# Patient Record
Sex: FEMALE | Race: ASIAN | NOT HISPANIC OR LATINO | URBAN - METROPOLITAN AREA
[De-identification: names, ages, dates, MRNs, and addresses within clinical notes are randomized per-mention and may not be internally consistent; named-entity substitution may affect disease eponyms.]

---

## 2024-11-05 ENCOUNTER — EMERGENCY (EMERGENCY)
Facility: HOSPITAL | Age: 69
LOS: 0 days | Discharge: HOME | End: 2024-11-05
Attending: EMERGENCY MEDICINE
Payer: COMMERCIAL

## 2024-11-05 VITALS
OXYGEN SATURATION: 98 % | RESPIRATION RATE: 18 BRPM | HEIGHT: 64 IN | WEIGHT: 108.03 LBS | HEART RATE: 71 BPM | SYSTOLIC BLOOD PRESSURE: 135 MMHG | TEMPERATURE: 99 F | DIASTOLIC BLOOD PRESSURE: 82 MMHG

## 2024-11-05 VITALS
DIASTOLIC BLOOD PRESSURE: 88 MMHG | RESPIRATION RATE: 18 BRPM | OXYGEN SATURATION: 99 % | HEART RATE: 82 BPM | TEMPERATURE: 99 F | SYSTOLIC BLOOD PRESSURE: 137 MMHG

## 2024-11-05 LAB
ALBUMIN SERPL ELPH-MCNC: 4 G/DL — SIGNIFICANT CHANGE UP (ref 3.5–5.2)
ALP SERPL-CCNC: 93 U/L — SIGNIFICANT CHANGE UP (ref 30–115)
ALT FLD-CCNC: 14 U/L — SIGNIFICANT CHANGE UP (ref 0–41)
ANION GAP SERPL CALC-SCNC: 10 MMOL/L — SIGNIFICANT CHANGE UP (ref 7–14)
AST SERPL-CCNC: 20 U/L — SIGNIFICANT CHANGE UP (ref 0–41)
B-OH-BUTYR SERPL-SCNC: <0.2 MMOL/L — SIGNIFICANT CHANGE UP
BASOPHILS # BLD AUTO: 0.03 K/UL — SIGNIFICANT CHANGE UP (ref 0–0.2)
BASOPHILS NFR BLD AUTO: 0.4 % — SIGNIFICANT CHANGE UP (ref 0–1)
BILIRUB SERPL-MCNC: 0.2 MG/DL — SIGNIFICANT CHANGE UP (ref 0.2–1.2)
BUN SERPL-MCNC: 23 MG/DL — HIGH (ref 10–20)
CALCIUM SERPL-MCNC: 9.3 MG/DL — SIGNIFICANT CHANGE UP (ref 8.4–10.4)
CHLORIDE SERPL-SCNC: 100 MMOL/L — SIGNIFICANT CHANGE UP (ref 98–110)
CO2 SERPL-SCNC: 23 MMOL/L — SIGNIFICANT CHANGE UP (ref 17–32)
CREAT SERPL-MCNC: 1 MG/DL — SIGNIFICANT CHANGE UP (ref 0.7–1.5)
EGFR: 61 ML/MIN/1.73M2 — SIGNIFICANT CHANGE UP
EOSINOPHIL # BLD AUTO: 0.01 K/UL — SIGNIFICANT CHANGE UP (ref 0–0.7)
EOSINOPHIL NFR BLD AUTO: 0.1 % — SIGNIFICANT CHANGE UP (ref 0–8)
GLUCOSE SERPL-MCNC: 207 MG/DL — HIGH (ref 70–99)
HCT VFR BLD CALC: 35.8 % — LOW (ref 37–47)
HGB BLD-MCNC: 11.8 G/DL — LOW (ref 12–16)
IMM GRANULOCYTES NFR BLD AUTO: 0.4 % — HIGH (ref 0.1–0.3)
LYMPHOCYTES # BLD AUTO: 2.08 K/UL — SIGNIFICANT CHANGE UP (ref 1.2–3.4)
LYMPHOCYTES # BLD AUTO: 29.7 % — SIGNIFICANT CHANGE UP (ref 20.5–51.1)
MCHC RBC-ENTMCNC: 30.3 PG — SIGNIFICANT CHANGE UP (ref 27–31)
MCHC RBC-ENTMCNC: 33 G/DL — SIGNIFICANT CHANGE UP (ref 32–37)
MCV RBC AUTO: 92 FL — SIGNIFICANT CHANGE UP (ref 81–99)
MONOCYTES # BLD AUTO: 0.85 K/UL — HIGH (ref 0.1–0.6)
MONOCYTES NFR BLD AUTO: 12.1 % — HIGH (ref 1.7–9.3)
NEUTROPHILS # BLD AUTO: 4.01 K/UL — SIGNIFICANT CHANGE UP (ref 1.4–6.5)
NEUTROPHILS NFR BLD AUTO: 57.3 % — SIGNIFICANT CHANGE UP (ref 42.2–75.2)
NRBC # BLD: 0 /100 WBCS — SIGNIFICANT CHANGE UP (ref 0–0)
PLATELET # BLD AUTO: 269 K/UL — SIGNIFICANT CHANGE UP (ref 130–400)
PMV BLD: 11 FL — HIGH (ref 7.4–10.4)
POTASSIUM SERPL-MCNC: 4.9 MMOL/L — SIGNIFICANT CHANGE UP (ref 3.5–5)
POTASSIUM SERPL-SCNC: 4.9 MMOL/L — SIGNIFICANT CHANGE UP (ref 3.5–5)
PROT SERPL-MCNC: 6.9 G/DL — SIGNIFICANT CHANGE UP (ref 6–8)
RBC # BLD: 3.89 M/UL — LOW (ref 4.2–5.4)
RBC # FLD: 13.5 % — SIGNIFICANT CHANGE UP (ref 11.5–14.5)
SODIUM SERPL-SCNC: 133 MMOL/L — LOW (ref 135–146)
WBC # BLD: 7.01 K/UL — SIGNIFICANT CHANGE UP (ref 4.8–10.8)
WBC # FLD AUTO: 7.01 K/UL — SIGNIFICANT CHANGE UP (ref 4.8–10.8)

## 2024-11-05 PROCEDURE — 36000 PLACE NEEDLE IN VEIN: CPT

## 2024-11-05 PROCEDURE — 85025 COMPLETE CBC W/AUTO DIFF WBC: CPT

## 2024-11-05 PROCEDURE — 80053 COMPREHEN METABOLIC PANEL: CPT

## 2024-11-05 PROCEDURE — 99284 EMERGENCY DEPT VISIT MOD MDM: CPT

## 2024-11-05 PROCEDURE — 36415 COLL VENOUS BLD VENIPUNCTURE: CPT

## 2024-11-05 PROCEDURE — 82010 KETONE BODYS QUAN: CPT

## 2024-11-05 PROCEDURE — 82962 GLUCOSE BLOOD TEST: CPT

## 2024-11-05 PROCEDURE — 99283 EMERGENCY DEPT VISIT LOW MDM: CPT | Mod: 25

## 2024-11-05 RX ORDER — DIPHENHYDRAMINE HYDROCHLORIDE AND LIDOCAINE HYDROCHLORIDE AND ALUMINUM HYDROXIDE AND MAGNESIUM HYDRO
30 KIT ONCE
Refills: 0 | Status: COMPLETED | OUTPATIENT
Start: 2024-11-05 | End: 2024-11-05

## 2024-11-05 RX ADMIN — Medication 1000 MILLILITER(S): at 17:58

## 2024-11-05 RX ADMIN — DIPHENHYDRAMINE HYDROCHLORIDE AND LIDOCAINE HYDROCHLORIDE AND ALUMINUM HYDROXIDE AND MAGNESIUM HYDRO 30 MILLILITER(S): KIT at 17:26

## 2024-11-05 NOTE — ED ADULT NURSE NOTE - OBJECTIVE STATEMENT
pt is a 69 y.o. female c/o mouth pain, pt reports a hx of mouth ulcers, and no relief. pt is ax4, on room air, pt rates 9/10 pain, pt appears uncomfortable, pt skin is dry and intact, airway is  clear, no other concerns are noted.

## 2024-11-05 NOTE — ED PROVIDER NOTE - NSFOLLOWUPINSTRUCTIONS_ED_ALL_ED_FT
Please follow up with your primary care physician within 24-72 hours and return immediately if symptoms worsen.    Our Emergency Department Referral Coordinators will be reaching out to you in the next 24-48 hours from 9:00am to 5:00pm with a follow up appointment. Please expect a phone call from the hospital in that time frame. If you do not receive a call or if you have any questions or concerns, you can reach them at   [phone]    Oral Ulcers  Oral ulcers are small sores inside the mouth or near the mouth. They may occur on or inside the lips, inside the cheeks, on the tongue, or anywhere else inside or near the mouth. They may be called canker sores or cold sores, which are two types of oral ulcers. Many oral ulcers are harmless and go away on their own. In some cases, oral ulcers may require medical care to determine the cause and proper treatment.    What are the causes?  Common causes of this condition include:  Infections caused by viruses, bacteria, or fungi.  Emotional stress.  Foods or chemicals that irritate the mouth.  Injury or physical irritation of the mouth.  Medicines.  Allergies.  Tobacco use.  Less common causes include:  Skin disease.  A type of herpes virus infection (herpes simplex or herpes zoster).  Oral cancer.  In some cases, the cause may not be known.    What increases the risk?  You are more likely to develop this condition if:  You wear dental braces, dentures, or retainers.  You do not take good care of your mouth and teeth (poor oral hygiene).  You have sensitive skin.  You have a condition that affects the entire body (systemic condition), such as an immune disorder.  What are the signs or symptoms?  A mouth with oral ulcers on the inside of the lips.  The main symptom of this condition is having one or more oval-shaped or round ulcers that have red borders. Symptoms may vary depending on the cause. This includes:  Location of the ulcers. Ulcers may be found inside the mouth, on the gums, or on the insides of the lips or cheeks. They may also be found on the lips or on skin that is near the mouth, such as the cheeks or chin.  Pain. Ulcers can be painful and uncomfortable, or they can be painless.  Appearance of the ulcers. They may look like red blisters and be filled with fluid, or they may be white or yellow patches.  Frequency of outbreaks. Ulcers may go away permanently after one outbreak, or they may come back (recur) often or rarely.  How is this diagnosed?  This condition is diagnosed with a physical exam. Your health care provider may ask you questions about your lifestyle and your medical history. You may have tests, including:  Blood tests.  Removal of a small number of cells from an ulcer to be examined under a microscope (biopsy).  How is this treated?  Treatment depends on the severity and cause of the condition. Oral ulcers often go away on their own in 1–2 weeks. Treatment may include medicines, such as:  Medicines to treat a viral infection (antivirals), a bacterial infection (antibiotics), or a fungal infection (antifungals).  Medicines to help control pain. This may include:  Over-the-counter pain medicines.  Gel, cream, or spray to numb the area (topical anesthetic) if you have severe pain.  Other medicines to coat or numb your mouth.  Follow these instructions at home:  Medicines    Take or use over-the-counter and prescription medicines only as told by your health care provider.  If you were prescribed an antibiotic medicine, take it as told by your health care provider. Do not stop taking the antibiotic even if you start to feel better.  Do not use products that contain benzocaine (including numbing gels) to treat teething or mouth pain in children who are younger than 2 years. These products may cause a rare but serious blood condition.  Eating and drinking    Eat a balanced diet. Do not eat:  Spicy foods.  Citrus, such as oranges.  Other foods and drinks that you think may cause or irritate your ulcers.  Drink enough fluid to keep your urine pale yellow.  Avoid drinking alcohol.  Lifestyle    How to floss your teeth, with close-up showing that a person should floss the sides of each tooth.  Practice good oral hygiene:  Gently brush your teeth with a soft toothbrush two times a day.  Floss your teeth every day.  Get regular dental cleanings and checkups.  Do not use any products that contain nicotine or tobacco. These products include cigarettes, chewing tobacco, and vaping devices, such as e-cigarettes. If you need help quitting, ask your health care provider.  Managing pain    If directed, put ice on your face in the affected area to help reduce pain. To do this:  Put ice in a plastic bag.  Place a towel between your skin and the bag.  Leave the ice on for 20 minutes, 2–3 times a day.  Remove the ice if your skin turns bright red. This is very important. If you cannot feel pain, heat, or cold, you have a greater risk of damage to the area.  Avoid physical or chemical irritants that may have caused the ulcers or made them worse, such as mouthwashes that contain alcohol (ethanol). If you wear dental braces, dentures, or retainers, work with your health care provider to make sure these devices are fitted correctly.  If you were prescribed a prescription mouthwash to help reduce pain in your mouth, use it as told by your health care provider.  General instructions    Rinse your mouth with a mixture of salt and water 3–4 times a day or as told by your health care provider. To make salt water, completely dissolve ½–1 tsp (3–6 g) of salt in 1 cup (237 mL) of warm water.  Keep all follow-up visits. This is important.  Contact a health care provider if:  You have:  Pain that gets worse or does not get better with medicine.  Four or more ulcers at one time.  A fever.  New ulcers that look or feel different from other ulcers you have.  Inflammation in one eye or both eyes.  Ulcers that do not go away after 10 days.  You develop new symptoms in your mouth, such as:  Bleeding or crusting around your lips or gums.  Tooth pain.  Difficulty swallowing.  You develop symptoms on your skin or genitals, such as:  A rash or blisters.  Burning or itching sensations.  Your ulcers begin or get worse after you start a new medicine.  Get help right away if:  You have difficulty breathing.  You have swelling in your face or neck.  You have excessive bleeding from your mouth.  You have severe pain.  Summary  Oral ulcers may occur anywhere inside or near the mouth.  They can be caused by many things, such as infections, stress, injury or irritation, or tobacco use.  Oral ulcers can be painful or painless.  Treatment may include medicines to relieve pain or to treat an infection (if appropriate).  Most oral ulcers go away in 1–2 weeks.  This information is not intended to replace advice given to you by your health care provider. Make sure you discuss any questions you have with your health care provider.    Document Revised: 09/28/2022 Document Reviewed: 09/28/2022

## 2024-11-05 NOTE — ED ADULT TRIAGE NOTE - CHIEF COMPLAINT QUOTE
pt brought to ED for eval of bleeding mouth ulcers x4 days pt brought to ED for eval of bleeding mouth ulcers and decreased PO intake x4 days

## 2024-11-05 NOTE — ED PROVIDER NOTE - CLINICAL SUMMARY MEDICAL DECISION MAKING FREE TEXT BOX
Patient evaluated for chronic oral ulcers, labs reviewed, advised continue meds that were prescribed and to follow-up closely with dental tomorrow as well as PMD/dermatology this week and patient agreed.  Strict return precautions discussed and patient verbalized understanding.

## 2024-11-05 NOTE — ED PROVIDER NOTE - NS ED ATTENDING STATEMENT MOD
Yes Attending with This was a shared visit with the OJNATHAN. I reviewed and verified the documentation.

## 2024-11-05 NOTE — ED PROVIDER NOTE - PHYSICAL EXAMINATION
Gen: NAD, AOx3  Head: NCAT  HEENT: PERRL, oral mucosa moist, normal conjunctiva, oropharynx clear without exudate or erythema, mild erythematous L oral ulcer   Lung: CTAB, no respiratory distress, no wheezing, rales, rhonchi  CV: normal s1/s2, rrr, Normal perfusion, pulses 2+ throughout  Abd: soft, NTND, no CVA tenderness  Genitourinary: no pelvic tenderness  MSK: No edema, no visible deformities, full range of motion in all 4 extremities  Neuro: CN II-XII grossly intact, No focal neurologic deficits  Skin: No rash   Psych: normal affect

## 2024-11-05 NOTE — ED PROVIDER NOTE - ATTENDING APP SHARED VISIT CONTRIBUTION OF CARE
69-year-old female with PMH HTN, CAD status post CABG, DM, GERD, HLD presents for evaluation of ulcerations inside mouth.  Patient states she has had these for the past 5 years and was treated by dermatologist given an oral and topical medication that she has not been using.  Patient states symptoms have persisted and now recently visiting  [place] for several months so wanted second opinion and came to ED.  Patient reports sx slightly more painful over past couple days. No fevers, chills, headache, dizziness, weight loss.  Patient states that when she eats certain foods it hurts in her gums have been hurting recently.  Denies any recent new medications, URI symptoms, chest pain, shortness of breath, dizziness or weakness.    VITAL SIGNS: noted  CONSTITUTIONAL: Well-developed; well-nourished; in no acute distress  HEAD: Normocephalic; atraumatic  EYES: PERRL, EOM intact; conjunctiva and sclera clear  ENT: No nasal discharge; airway clear. MMM, several apthous type ulcers noted in cheeks bilateral, posterior pharynx wnl, no tonsillar hypertrophy, phonating normally, no caries noted  NECK: Supple; non tender. No anterior cervical lymphadenopathy noted  CARD: S1, S2 normal; no murmurs, gallops, or rubs. Regular rate and rhythm  RESP: CTAB/L, no wheezes, rales or rhonchi  ABD: Normal bowel sounds; soft; non-distended; non-tender; no hepatosplenomegaly. No CVA tenderness  EXT: Normal ROM. No calf tenderness or edema. Distal pulses intact  NEURO: Alert, oriented. Grossly unremarkable. No focal deficits  SKIN: Skin exam is warm and dry, no acute rash

## 2024-11-05 NOTE — ED PROVIDER NOTE - ATTENDING CONTRIBUTION TO CARE
69-year-old female with PMH HTN, CAD status post CABG, DM, GERD, HLD presents for evaluation of ulcerations inside mouth.  Patient states she has had these for the past 5 years and was treated by dermatologist given an oral and topical medication that she has not been using.  Patient [facility] have persisted [facility] opinion and came to ED.  Patient reports sx slightly more painful over past couple days. No fevers, chills, headache, dizziness, weight loss.  Patient states that when she eats certain foods it hurts in her gums have been hurting recently.  Denies any recent new medications, URI symptoms, chest pain, shortness of breath, dizziness or weakness.    VITAL SIGNS: noted  CONSTITUTIONAL: Well-developed; well-nourished; in no acute distress  HEAD: Normocephalic; atraumatic  EYES: PERRL, EOM intact; conjunctiva and sclera clear  ENT: No nasal discharge; airway clear. MMM, several apthous type ulcers noted in cheeks bilateral, posterior pharynx wnl, no tonsillar hypertrophy, phonating normally, no caries noted  NECK: Supple; non tender. No anterior cervical lymphadenopathy noted  CARD: S1, S2 normal; no murmurs, gallops, or rubs. Regular rate and rhythm  RESP: CTAB/L, no wheezes, rales or rhonchi  ABD: Normal bowel sounds; soft; non-distended; non-tender; no hepatosplenomegaly. No CVA tenderness  EXT: Normal ROM. No calf tenderness or edema. Distal pulses intact  NEURO: Alert, oriented. Grossly unremarkable. No focal deficits  SKIN: Skin exam is warm and dry, no acute rash

## 2024-11-05 NOTE — ED PROVIDER NOTE - PATIENT PORTAL LINK FT
You can access the FollowMyHealth Patient Portal offered by [facility] by registering at the following website: [email or web address]. By joining [employer]’s FollowMyHealth portal, you will also be able to view your health information using other applications (apps) compatible with our system.

## 2024-11-05 NOTE — ED PROVIDER NOTE - OBJECTIVE STATEMENT
Patient/Caregiver provided printed discharge information.
69-year-old female with a past medical history of hypertension, hyperlipidemia, diabetes, GERD presents complaining of mouth ulcers over the past 5 years.  Patient states to be visiting from [place] and has followed with her dermatologist back home for the mouth ulcers several years prior. Patient states to have experienced increased pain over the past 4 days with worsening when eating/drinking. pt denies any other symptoms including fevers, chill, headache, recent illness/travel, cough, abdominal pain, chest pain, or SOB.

## 2024-11-06 NOTE — CHART NOTE - NSCHARTNOTEFT_GEN_A_CORE
appt requested in [facility] Chat, appt scheduled on 12/19 @ 2:45pm @ [address] - [facility] 11/6 (derm referral)

## 2024-12-19 ENCOUNTER — APPOINTMENT (OUTPATIENT)
Dept: DERMATOLOGY | Facility: CLINIC | Age: 69
End: 2024-12-19

## 2025-02-27 NOTE — ED ADULT NURSE NOTE - PAIN: BODY LOCATION
[FreeTextEntry3] : Bilateral breast ultrasound  Four-quadrant survey the left breast demonstrates no suspicious findings  Four-quadrant survey the right breast demonstrates no suspicious findings  BI-RADS 2 mouth

## 2025-06-26 ENCOUNTER — INPATIENT (INPATIENT)
Facility: HOSPITAL | Age: 70
LOS: 3 days | Discharge: HOME | DRG: 93 | End: 2025-06-30
Attending: PSYCHIATRY & NEUROLOGY | Admitting: PSYCHIATRY & NEUROLOGY
Payer: MEDICARE

## 2025-06-26 VITALS
TEMPERATURE: 98 F | HEART RATE: 83 BPM | SYSTOLIC BLOOD PRESSURE: 180 MMHG | OXYGEN SATURATION: 97 % | WEIGHT: 112.88 LBS | DIASTOLIC BLOOD PRESSURE: 90 MMHG | RESPIRATION RATE: 18 BRPM

## 2025-06-26 DIAGNOSIS — R20.0 ANESTHESIA OF SKIN: ICD-10-CM

## 2025-06-26 DIAGNOSIS — Z98.890 OTHER SPECIFIED POSTPROCEDURAL STATES: Chronic | ICD-10-CM

## 2025-06-26 LAB
ALBUMIN SERPL ELPH-MCNC: 4.4 G/DL — SIGNIFICANT CHANGE UP (ref 3.5–5.2)
ALP SERPL-CCNC: 106 U/L — SIGNIFICANT CHANGE UP (ref 30–115)
ALT FLD-CCNC: 12 U/L — SIGNIFICANT CHANGE UP (ref 0–41)
ANION GAP SERPL CALC-SCNC: 13 MMOL/L — SIGNIFICANT CHANGE UP (ref 7–14)
APPEARANCE UR: CLEAR — SIGNIFICANT CHANGE UP
APTT BLD: 30.7 SEC — SIGNIFICANT CHANGE UP (ref 27–39.2)
AST SERPL-CCNC: 12 U/L — SIGNIFICANT CHANGE UP (ref 0–41)
BASOPHILS # BLD AUTO: 0.04 K/UL — SIGNIFICANT CHANGE UP (ref 0–0.2)
BASOPHILS NFR BLD AUTO: 0.4 % — SIGNIFICANT CHANGE UP (ref 0–1)
BILIRUB SERPL-MCNC: 0.2 MG/DL — SIGNIFICANT CHANGE UP (ref 0.2–1.2)
BILIRUB UR-MCNC: NEGATIVE — SIGNIFICANT CHANGE UP
BUN SERPL-MCNC: 26 MG/DL — HIGH (ref 10–20)
CALCIUM SERPL-MCNC: 9.8 MG/DL — SIGNIFICANT CHANGE UP (ref 8.4–10.5)
CHLORIDE SERPL-SCNC: 97 MMOL/L — LOW (ref 98–110)
CO2 SERPL-SCNC: 22 MMOL/L — SIGNIFICANT CHANGE UP (ref 17–32)
COLOR SPEC: YELLOW — SIGNIFICANT CHANGE UP
CREAT SERPL-MCNC: 1 MG/DL — SIGNIFICANT CHANGE UP (ref 0.7–1.5)
DIFF PNL FLD: NEGATIVE — SIGNIFICANT CHANGE UP
EGFR: 60 ML/MIN/1.73M2 — SIGNIFICANT CHANGE UP
EGFR: 60 ML/MIN/1.73M2 — SIGNIFICANT CHANGE UP
EOSINOPHIL # BLD AUTO: 0 K/UL — SIGNIFICANT CHANGE UP (ref 0–0.7)
EOSINOPHIL NFR BLD AUTO: 0 % — SIGNIFICANT CHANGE UP (ref 0–8)
GLUCOSE BLDC GLUCOMTR-MCNC: 312 MG/DL — HIGH (ref 70–99)
GLUCOSE SERPL-MCNC: 285 MG/DL — HIGH (ref 70–99)
GLUCOSE UR QL: 500 MG/DL
HCT VFR BLD CALC: 36.8 % — LOW (ref 37–47)
HGB BLD-MCNC: 12.5 G/DL — SIGNIFICANT CHANGE UP (ref 12–16)
IMM GRANULOCYTES NFR BLD AUTO: 0.6 % — HIGH (ref 0.1–0.3)
INR BLD: 0.78 RATIO — SIGNIFICANT CHANGE UP (ref 0.65–1.3)
KETONES UR QL: NEGATIVE MG/DL — SIGNIFICANT CHANGE UP
LEUKOCYTE ESTERASE UR-ACNC: NEGATIVE — SIGNIFICANT CHANGE UP
LYMPHOCYTES # BLD AUTO: 1.86 K/UL — SIGNIFICANT CHANGE UP (ref 1.2–3.4)
LYMPHOCYTES # BLD AUTO: 20 % — LOW (ref 20.5–51.1)
MCHC RBC-ENTMCNC: 30.6 PG — SIGNIFICANT CHANGE UP (ref 27–31)
MCHC RBC-ENTMCNC: 34 G/DL — SIGNIFICANT CHANGE UP (ref 32–37)
MCV RBC AUTO: 90 FL — SIGNIFICANT CHANGE UP (ref 81–99)
MONOCYTES # BLD AUTO: 0.74 K/UL — HIGH (ref 0.1–0.6)
MONOCYTES NFR BLD AUTO: 8 % — SIGNIFICANT CHANGE UP (ref 1.7–9.3)
NEUTROPHILS # BLD AUTO: 6.59 K/UL — HIGH (ref 1.4–6.5)
NEUTROPHILS NFR BLD AUTO: 71 % — SIGNIFICANT CHANGE UP (ref 42.2–75.2)
NITRITE UR-MCNC: NEGATIVE — SIGNIFICANT CHANGE UP
NRBC BLD AUTO-RTO: 0 /100 WBCS — SIGNIFICANT CHANGE UP (ref 0–0)
PH UR: 7 — SIGNIFICANT CHANGE UP (ref 5–8)
PLATELET # BLD AUTO: 278 K/UL — SIGNIFICANT CHANGE UP (ref 130–400)
PMV BLD: 11.2 FL — HIGH (ref 7.4–10.4)
POTASSIUM SERPL-MCNC: 4.8 MMOL/L — SIGNIFICANT CHANGE UP (ref 3.5–5)
POTASSIUM SERPL-SCNC: 4.8 MMOL/L — SIGNIFICANT CHANGE UP (ref 3.5–5)
PROT SERPL-MCNC: 7.3 G/DL — SIGNIFICANT CHANGE UP (ref 6–8)
PROT UR-MCNC: NEGATIVE MG/DL — SIGNIFICANT CHANGE UP
PROTHROM AB SERPL-ACNC: 9.2 SEC — LOW (ref 9.95–12.87)
RBC # BLD: 4.09 M/UL — LOW (ref 4.2–5.4)
RBC # FLD: 13.3 % — SIGNIFICANT CHANGE UP (ref 11.5–14.5)
SODIUM SERPL-SCNC: 132 MMOL/L — LOW (ref 135–146)
SP GR SPEC: 1.01 — SIGNIFICANT CHANGE UP (ref 1–1.03)
TROPONIN T, HIGH SENSITIVITY RESULT: 20 NG/L — HIGH (ref 6–13)
TROPONIN T, HIGH SENSITIVITY RESULT: 21 NG/L — HIGH (ref 6–13)
UROBILINOGEN FLD QL: 0.2 MG/DL — SIGNIFICANT CHANGE UP (ref 0.2–1)
WBC # BLD: 9.29 K/UL — SIGNIFICANT CHANGE UP (ref 4.8–10.8)
WBC # FLD AUTO: 9.29 K/UL — SIGNIFICANT CHANGE UP (ref 4.8–10.8)

## 2025-06-26 PROCEDURE — 80061 LIPID PANEL: CPT

## 2025-06-26 PROCEDURE — C1769: CPT

## 2025-06-26 PROCEDURE — 72131 CT LUMBAR SPINE W/O DYE: CPT

## 2025-06-26 PROCEDURE — 97167 OT EVAL HIGH COMPLEX 60 MIN: CPT | Mod: GO

## 2025-06-26 PROCEDURE — 80053 COMPREHEN METABOLIC PANEL: CPT

## 2025-06-26 PROCEDURE — 36224 PLACE CATH CAROTD ART: CPT | Mod: 50

## 2025-06-26 PROCEDURE — 84100 ASSAY OF PHOSPHORUS: CPT

## 2025-06-26 PROCEDURE — 36227 PLACE CATH XTRNL CAROTID: CPT | Mod: RT

## 2025-06-26 PROCEDURE — 84443 ASSAY THYROID STIM HORMONE: CPT

## 2025-06-26 PROCEDURE — 83735 ASSAY OF MAGNESIUM: CPT

## 2025-06-26 PROCEDURE — 36415 COLL VENOUS BLD VENIPUNCTURE: CPT

## 2025-06-26 PROCEDURE — 70450 CT HEAD/BRAIN W/O DYE: CPT | Mod: 26,XU

## 2025-06-26 PROCEDURE — A9579: CPT

## 2025-06-26 PROCEDURE — 86900 BLOOD TYPING SEROLOGIC ABO: CPT

## 2025-06-26 PROCEDURE — C1894: CPT

## 2025-06-26 PROCEDURE — 99285 EMERGENCY DEPT VISIT HI MDM: CPT

## 2025-06-26 PROCEDURE — 72131 CT LUMBAR SPINE W/O DYE: CPT | Mod: 26

## 2025-06-26 PROCEDURE — 80048 BASIC METABOLIC PNL TOTAL CA: CPT

## 2025-06-26 PROCEDURE — 93306 TTE W/DOPPLER COMPLETE: CPT

## 2025-06-26 PROCEDURE — 85730 THROMBOPLASTIN TIME PARTIAL: CPT

## 2025-06-26 PROCEDURE — 70498 CT ANGIOGRAPHY NECK: CPT | Mod: 26

## 2025-06-26 PROCEDURE — 85025 COMPLETE CBC W/AUTO DIFF WBC: CPT

## 2025-06-26 PROCEDURE — 70551 MRI BRAIN STEM W/O DYE: CPT

## 2025-06-26 PROCEDURE — 86850 RBC ANTIBODY SCREEN: CPT

## 2025-06-26 PROCEDURE — 72141 MRI NECK SPINE W/O DYE: CPT

## 2025-06-26 PROCEDURE — 36226 PLACE CATH VERTEBRAL ART: CPT | Mod: 50

## 2025-06-26 PROCEDURE — 82962 GLUCOSE BLOOD TEST: CPT

## 2025-06-26 PROCEDURE — C1887: CPT

## 2025-06-26 PROCEDURE — 70496 CT ANGIOGRAPHY HEAD: CPT | Mod: 26

## 2025-06-26 PROCEDURE — 85610 PROTHROMBIN TIME: CPT

## 2025-06-26 PROCEDURE — 83036 HEMOGLOBIN GLYCOSYLATED A1C: CPT

## 2025-06-26 PROCEDURE — 86901 BLOOD TYPING SEROLOGIC RH(D): CPT

## 2025-06-26 PROCEDURE — 71045 X-RAY EXAM CHEST 1 VIEW: CPT | Mod: 26

## 2025-06-26 PROCEDURE — 97163 PT EVAL HIGH COMPLEX 45 MIN: CPT | Mod: GP

## 2025-06-26 PROCEDURE — 76937 US GUIDE VASCULAR ACCESS: CPT

## 2025-06-26 RX ORDER — DEXTROSE 50 % IN WATER 50 %
15 SYRINGE (ML) INTRAVENOUS ONCE
Refills: 0 | Status: DISCONTINUED | OUTPATIENT
Start: 2025-06-26 | End: 2025-06-30

## 2025-06-26 RX ORDER — METOPROLOL SUCCINATE 50 MG/1
1 TABLET, EXTENDED RELEASE ORAL
Refills: 0 | DISCHARGE

## 2025-06-26 RX ORDER — ASPIRIN 325 MG
81 TABLET ORAL DAILY
Refills: 0 | Status: DISCONTINUED | OUTPATIENT
Start: 2025-06-27 | End: 2025-06-30

## 2025-06-26 RX ORDER — GLUCAGON 3 MG/1
1 POWDER NASAL ONCE
Refills: 0 | Status: DISCONTINUED | OUTPATIENT
Start: 2025-06-26 | End: 2025-06-30

## 2025-06-26 RX ORDER — DEXTROSE 50 % IN WATER 50 %
25 SYRINGE (ML) INTRAVENOUS ONCE
Refills: 0 | Status: DISCONTINUED | OUTPATIENT
Start: 2025-06-26 | End: 2025-06-30

## 2025-06-26 RX ORDER — CLOPIDOGREL BISULFATE 75 MG/1
75 TABLET, FILM COATED ORAL ONCE
Refills: 0 | Status: COMPLETED | OUTPATIENT
Start: 2025-06-26 | End: 2025-06-26

## 2025-06-26 RX ORDER — DEXTROSE 50 % IN WATER 50 %
12.5 SYRINGE (ML) INTRAVENOUS ONCE
Refills: 0 | Status: DISCONTINUED | OUTPATIENT
Start: 2025-06-26 | End: 2025-06-30

## 2025-06-26 RX ORDER — SODIUM CHLORIDE 9 G/1000ML
1000 INJECTION, SOLUTION INTRAVENOUS
Refills: 0 | Status: DISCONTINUED | OUTPATIENT
Start: 2025-06-26 | End: 2025-06-30

## 2025-06-26 RX ORDER — INSULIN LISPRO 100 U/ML
INJECTION, SOLUTION INTRAVENOUS; SUBCUTANEOUS
Refills: 0 | Status: DISCONTINUED | OUTPATIENT
Start: 2025-06-26 | End: 2025-06-30

## 2025-06-26 RX ORDER — ATORVASTATIN CALCIUM 80 MG/1
80 TABLET, FILM COATED ORAL AT BEDTIME
Refills: 0 | Status: DISCONTINUED | OUTPATIENT
Start: 2025-06-26 | End: 2025-06-30

## 2025-06-26 RX ORDER — LOSARTAN POTASSIUM 100 MG/1
1 TABLET, FILM COATED ORAL
Refills: 0 | DISCHARGE

## 2025-06-26 RX ORDER — CLOPIDOGREL BISULFATE 75 MG/1
75 TABLET, FILM COATED ORAL DAILY
Refills: 0 | Status: DISCONTINUED | OUTPATIENT
Start: 2025-06-27 | End: 2025-06-29

## 2025-06-26 RX ORDER — ENOXAPARIN SODIUM 100 MG/ML
40 INJECTION SUBCUTANEOUS EVERY 24 HOURS
Refills: 0 | Status: DISCONTINUED | OUTPATIENT
Start: 2025-06-26 | End: 2025-06-30

## 2025-06-26 RX ORDER — INSULIN GLARGINE-YFGN 100 [IU]/ML
0 INJECTION, SOLUTION SUBCUTANEOUS
Refills: 0 | DISCHARGE

## 2025-06-26 RX ORDER — LOSARTAN POTASSIUM 100 MG/1
50 TABLET, FILM COATED ORAL DAILY
Refills: 0 | Status: DISCONTINUED | OUTPATIENT
Start: 2025-06-26 | End: 2025-06-30

## 2025-06-26 RX ORDER — METOPROLOL SUCCINATE 50 MG/1
12.5 TABLET, EXTENDED RELEASE ORAL
Refills: 0 | Status: DISCONTINUED | OUTPATIENT
Start: 2025-06-26 | End: 2025-06-30

## 2025-06-26 RX ORDER — MELATONIN 5 MG
1 TABLET ORAL
Refills: 0 | DISCHARGE

## 2025-06-26 RX ORDER — OMEPRAZOLE 20 MG/1
1 CAPSULE, DELAYED RELEASE ORAL
Refills: 0 | DISCHARGE

## 2025-06-26 RX ORDER — ASPIRIN 325 MG
81 TABLET ORAL ONCE
Refills: 0 | Status: COMPLETED | OUTPATIENT
Start: 2025-06-26 | End: 2025-06-26

## 2025-06-26 RX ORDER — FERROUS SULFATE 137(45) MG
325 TABLET, EXTENDED RELEASE ORAL
Refills: 0 | DISCHARGE

## 2025-06-26 RX ORDER — METFORMIN HYDROCHLORIDE 500 MG/1
1 TABLET ORAL
Refills: 0 | DISCHARGE

## 2025-06-26 RX ORDER — MELATONIN 5 MG
5 TABLET ORAL AT BEDTIME
Refills: 0 | Status: DISCONTINUED | OUTPATIENT
Start: 2025-06-26 | End: 2025-06-30

## 2025-06-26 RX ADMIN — CLOPIDOGREL BISULFATE 75 MILLIGRAM(S): 75 TABLET, FILM COATED ORAL at 20:14

## 2025-06-26 RX ADMIN — Medication 81 MILLIGRAM(S): at 20:14

## 2025-06-26 RX ADMIN — ATORVASTATIN CALCIUM 80 MILLIGRAM(S): 80 TABLET, FILM COATED ORAL at 22:31

## 2025-06-26 RX ADMIN — INSULIN LISPRO 8: 100 INJECTION, SOLUTION INTRAVENOUS; SUBCUTANEOUS at 22:30

## 2025-06-26 RX ADMIN — Medication 5 MILLIGRAM(S): at 22:31

## 2025-06-26 NOTE — ED PROVIDER NOTE - EKG/XRAY ADDITIONAL INFORMATION
Independent interpretation of the chest  film performed by: Dr. Emiliano Alexander: [facility]   ekg - Normal sinus rhythm nonspecific ST-T wave lateral change

## 2025-06-26 NOTE — ED PROVIDER NOTE - CLINICAL SUMMARY MEDICAL DECISION MAKING FREE TEXT BOX
Pt signed out to me f/u neuro consult - 69 y/o F with a PMHx of HTN, HLD, CAD (supposed to be on ASA 81mg once daily), DM, GERD, chronic back issues with back surgery in [place],STEMI in [place] 5 years ago, presenting to the ED with subjective decreased sensation starting in the LLE x 2 days ago and in the LUE yesterday. 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 Admitted to stroke unit for suspected stroke.

## 2025-06-26 NOTE — ED PROVIDER NOTE - WR ORDER STATUS 1
full  Mouth opening: > = 3 FB   Dental:    (+) edentulous      Pulmonary:normal exam  breath sounds clear to auscultation  (+)  COPD (not clinically symptomatic):          current smoker                          ROS comment: 1/2 pack to 1 pack/day smoking tobacco    EXAM: CT CHEST WO CONTRAST     DATE: 11/24/2023 9:19 AM     INDICATION: Rule out chest injury     COMPARISON: None     IV CONTRAST: None     TECHNIQUE:   Noncontrast thin axial images were obtained through the chest. Coronal and  sagittal reformats were generated. CT dose reduction was achieved through use of  a standardized protocol tailored for this examination and automatic exposure  control for dose modulation.     FINDINGS:      CHEST WALL: Partially imaged displaced, comminuted right clavicular fracture.  Subcutaneous stranding in the right upper anterior chest wall. No collections.  THYROID: No nodule.  MEDIASTINUM: No mass or lymphadenopathy.  BELTRAN: No mass or lymphadenopathy.  THORACIC AORTA: No aneurysm.  MAIN PULMONARY ARTERY: Normal in caliber.  TRACHEA/BRONCHI: Patent.  ESOPHAGUS: No wall thickening or dilatation.  HEART: Normal size. Moderate coronary calcium.  PLEURA: No effusion or pneumothorax.  LUNGS: Severe centrilobular emphysema without without masses, or suspicious  pulmonary nodules. Mild consolidation/groundglass in the dependent right lower  lobe. Associated mild bronchiectasis.  VISUALIZED ABDOMEN: Scattered low-density hepatic lesions most compatible with  cysts.     ADDITIONAL COMMENTS: N/A     IMPRESSION:  1.  Mild consolidation/groundglass posterior dependent right lower lobe,  possibly from atelectasis versus contusion. Component of mild chronic fibrosis  is likely.  2.  Displaced, mildly comminuted right midclavicular fracture.  3.  Healing posterior right eighth rib fracture.  4.  Severe emphysema.   5.  Moderate coronary calcium.   Cardiovascular:    (+) hypertension:, past MI: > 6 months and no interval change,  Performed Resulted

## 2025-06-26 NOTE — H&P ADULT - NSICDXPASTSURGICALHX_GEN_ALL_CORE_FT
PDMP reviewed; no aberrant behavior identified, prescription authorized.    Medication: amphetamine-dextroamphetamine (Adderall) 30 MG tablet     Last refill: 02/27/2024  Next appt: 08/07/2024     PAST SURGICAL HISTORY:  Status post lumbar surgery

## 2025-06-26 NOTE — H&P ADULT - ASSESSMENT
69 y/o F with a PMHx of HTN, HLD, CAD (supposed to be on ASA 81mg once daily), DM, GERD, chronic back issues with back surgery in [place],STEMI in [place] 5 years ago, presenting to the ED with subjective decreased sensation starting in the LLE x 2 days ago and in the LUE yesterday. NIHSS 2 for decreased sensation in LUE/LLE (face sparing), and LOC month. 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 Admitted to stroke unit for suspected stroke.     #Possible R sided lacunar infarct   - aspirin 81mg and plavix 75mg daily  - atorvastatin 80mg daily  - q8hr stroke neuro checks and vitals  -MRI brain non con   -TTE   -PT/OT/SLP     Cards  #HTN  - Goal -160   - hold home blood pressure medication for now  - obtain TTE with bubble    #7mm aneurysm   -LISA consult     #HLD  - high dose statin     DVT Prophylaxis  - lovenox sq for DVT prophylaxis   - SCDs for DVT prophylaxis      69 y/o F with a PMHx of HTN, HLD, CAD (supposed to be on ASA 81mg once daily), DM, GERD, chronic back issues with back surgery in [place],STEMI in [place] 5 years ago, presenting to the ED with subjective decreased sensation starting in the LLE x 2 days ago and in the LUE yesterday. NIHSS 2 for decreased sensation in LUE/LLE (face sparing), and LOC month. 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 Admitted to stroke unit for suspected stroke.     #Possible R sided lacunar infarct   - aspirin 81mg and plavix 75mg daily  - atorvastatin 80mg daily  - q8hr stroke neuro checks and vitals  -MRI brain non con   -TTE   -PT/OT/SLP     Cards  #HTN  #CAD  - Goal -160   - obtain TTE with bubble  - c/w home Metoprolol and Losartan    #7mm aneurysm   -LISA consult     #DM  - f/u A1c  - Started sliding scale. Monitor FS and adjust insulin as needed.     #HLD  - high dose statin       #MISC  DVT Prophylaxis  - lovenox sq for DVT prophylaxis   - SCDs for DVT prophylaxis   - Melatonin 5mg at night

## 2025-06-26 NOTE — ED PROVIDER NOTE - NS_EDPROVIDERDISPOUSERTYPE_ED_A_ED
Telephone Encounter by Polina Lugo at 04/27/17 08:51 AM     Author:  Polina Lugo Service:  (none) Author Type:  Patient      Filed:  04/27/17 08:51 AM Encounter Date:  4/27/2017 Status:  Signed     :  Polina Lugo (Patient )       From: Eder Michel  To: Jama Dozier MD  Sent: 4/27/2017  7:28 AM CDT  Subject: Medication Renewal Request    Original authorizing provider: MD Eder Rodriguez would like a refill of the following medications:  escitalopram (LEXAPRO) 10 MG tablet [Jama Dozier MD]    Preferred pharmacy: [address]    Comment:  Good morning. I sent a request in Tuesday morning to refill my   Escitalopram prescription. Since I have not received any correspondence, I   wanted to follow up with my request. If there is an issue or concern with   my request, please let me know. Thank you for your assistance.       Revision History        Date/Time User Provider Type Action    > 04/27/17 08:51 AM Polina Lugo Patient  Sign    Attribution information within the note text is not available.             Attending Attestation (For Attendings USE Only)...

## 2025-06-26 NOTE — ED PROVIDER NOTE - ATTENDING APP SHARED VISIT CONTRIBUTION OF CARE
70 hx htn, dm, gerd, hld, CAD s/p CABG  pt here for eval of L arm pain and L leg numbness. sx started yesterday. no chest pain however, pt states she felt like this before her last MI. no incontinence/retention. no LOZANO.  no palpitations/syncope/trauma/fevers.    vss  gen- NAD, aaox3  card-rrr  lungs-ctab, no wheezing or rhonchi  abd-sntnd, no guarding or rebound  neuro- full str, LLE w/ subj decrease in sensation, otherwise nml, cn ii-xii grossly intact, normal coordination and gait, speech clear

## 2025-06-26 NOTE — H&P ADULT - HISTORY OF PRESENT ILLNESS
69 y/o F with a PMHx of HTN, HLD, CAD (supposed ot be on ASA 81mg once daily), DM, GERD, chronic back issues with back surgery in [place], MI in [place] 5 years ago, presenting to the ED with subjective decreased sensation in the LUE/LLE. Pt states when she woke up 2 days ago, her LLE felt different to her and numb, and when she woke up yesterday, her LUE felt numb in addition to her LLE. She came to the ED with concerns for another MI as she states she felt L sided numbness in her arm and leg at that time too. Pt has not taken any of her medication in the past month because she is visiting [place] and cannot fill her prescription. /90. Denies accompanying speech issues, vision changes, facial involvement, extremity weakness, dizziness. Endorses that her gait has been abnormal for the past couple of years due to her back surgery, however does not feel her gait worsened. Denies known hx of stroke, TIA, seizure, neck pain.

## 2025-06-26 NOTE — ED PROVIDER NOTE - PRINCIPAL DIAGNOSIS
[facility]    Medicine Progress Note - Hospitalist Service    Date of Admission:  8/5/2024    Assessment & Plan   85M with history of HFrEF, hyperlipidemia, hypertension, aortic stenosis status post TAVR, thrombophilia, type 2 diabetes, stage III sacral ulcer, CAD, prior CVA admitted on 8/5/2024 with altered mental status & hypotension secondary to sepsis possibly from urinary tract infection, aspiration pneumonia and suspected infected, unstageable sacral ulcer.     He was managed briefly at the ICU by instensivist on admission but did not require vasopressor. Hypotension and ARIADNE improved with IV hydration and albumin infusion.  Patient transferred to medical floor on 8/6/2024.  However, patient transferred back to ICU for possible septic shock and management with pressors.  On 8/8/2024 patient transferred back to floor.  UC grew E. coli.  BC grew ESBL E Coli & Streptococcus salivarius. Polymicrobial bacteremia thought to be secondary to infected sacral decubitus ulcer.  Patient underwent multiple bedside debridement and eventually OR debridement with diverting colostomy on 8/13/2024.       Assessment/Plan  # Septic shock, resolved  #Infected sacral decubitus ulcer;  #Polymicrobial bacteremia with ESBL and Strep salivarius due to infected sacral ulcer;  Patient underwent multiple bedside debridement and eventually OR debridement with diverting colostomy on 8/13/2024.  -S/p debridement 8/6/2024 with Cx polymicrobial (E Coli, B Fragilis, Staph Aureus, E faecium)  -Ucx ESBL E Coli & Strep salivarius  -Unable to obtain MRI secondary to intracardiac lead. CT Pelvis without any signs of osteo or abscess  -Abx by ID, currently on meropenem (8/7- )  added daptomycin for VRE (8/10- ), vancomycin (8/5-8/8)  -Stoma care, wound vac per WOC and surgery team  -Midodrine 5mg TID was started during this admission for hypotension, (5mg BID 8/10 and 5mg daily 8/11 and then off)-now 8/14 having hypotension  again, added midodrine as needed    #Anasarca - improving.  --Diuretics changed to daily.  Monitor labs closely.    #Electrolyte imbalance-replace per protocol     #Acute on chronic pain from above  -Scheduled Tylenol, try to wean off.  -Oxycodone 2.5-5mg q4h PRN, dilaudid for breakthrough     #History of aspiration and dysphagia due to stroke;  #Aspiration PNA vs CAP LLL  -SLP following, easy to chew level 7 and mildly thick level 2 diet with water protocol  -Video swallow study revealed silent aspiration with thin liquid, including with chin tuck  -Aspiration precaution     #Recent acute ischemic stroke (5/25/2024)  #Residual left Hemiparesis   -Recent admission 5/25-6/7/2024 at [facility] for ischemic stroke; discharged to TCU   -PT/OT      #History of the left upper extremity DVT with thrombophilia (positive antiphospholipid antibodies, heterozygous factor V Leiden on coumadin)  -On Coumadin.  INR subtherapeutic.  Adjusted Coumadin dose, monitor INR closely.  Pharmacy following     #Diabetes mellitus type 2, noninsulin dependent  -home regimen: jardiance    -inpatient: Lantus 5u, insulin sliding scale and hypoglycemic protocol     #Moderate Malnutrition  #S/p PEG   -Nutrition is following. Calorie count ongoing.   -On 9/21, personally discussed with registered dietitian, reported patient not meeting his nutritional needs, will consider nocturnal tube feeding if patient agrees     #S/P TAVR (transcatheter aortic valve replacement) and dual chamber PM 11/2014   #H/o HFrEF with EF recovery  #CAD   -History of distal RCA stent, last echo 8/2019 with EF 50-55%  -had hypotension with resumption of low dose coreg.  Start low dose toprol-xl instead.     -PTA crestor on hold due to patient on daptomycin    #GERD/Hx PUD   -PPI PO qAM      Dispo:  -Ordered DME (Kevin lift, hospital bed, wheelchair)  -Outpatient palliative care consult placed     Need for the hospital bed;  -A condition requiring positioning of body to elevate  pain, that cannot be accommodated in an ordinary bed  -Protection from serious injury that cannot be accommodated in an ordinary bed  -A condition that requires the head of the bed to be elevated more than 30 degrees most of the time, however below her voice is did not meet the patient's needs    Additional semi-electric requirements;  -Patient has history of CVA with residual left-sided weakness.  Patient requires frequent change in body position and/or immediate changes in body positions required to elevate pain or address her medical condition and the patient is able to use the electronic controls.    Wheelchair need;  Need  -All mobility limitations that impair the ability to accomplish mobility-related activities of daily living (MRADLS) entirely, safely are within a reasonable timeframe  Alternative  -A mobility limitation that cannot be resolved by the use of cane or a walker  Use  -The patient's home provides adequate access, maneuvering space, services and patient can safely self propel the manual wheelchair or there is a caregiver who can assist  Benefit  -The benefit of manual wheelchair will significantly improve the ability to participate in MRA LDS and the patient will use it on a regular basis in the home (has not expressed and unwillingness to use)  David-Height  -The patient requires a lower seat height less than 19 inch because of shorter stature or to enable the beneficiary to place his feet on the ground for propulsion.  Accessories;  -Justification for all additional accessories; including anti-tippers (for maneuvering surfaces and ramps), seatbelts (for tone, spasticity or positioning), height adjustable arms (uses wheelchair over 2 hours/day) elevating leg rest, sit with and or depth more than 20 inch          Diet: Snacks/Supplements Adult: Magic Cup; With Meals  Snacks/Supplements Adult: Expedite Cup; With Meals  Room Service  Easy to Chew Diet (level 7) Mildly Thick (level 2) (Water  "protocol)  Snacks/Supplements Adult: Ensure Enlive; With Meals    DVT Prophylaxis: Warfarin  Javier Catheter: PRESENT, indication: Acute retention or obstruction, Wound deterioration and failed external collection device  Lines: PRESENT      PICC 08/15/24 Single Lumen Right Brachial vein medial IV Antibiotics-Site Assessment: WDL      Cardiac Monitoring: None  Code Status: No CPR- Do NOT Intubate      Clinically Significant Risk Factors        # Hypokalemia: Lowest K = 3.2 mmol/L in last 2 days, will replace as needed     # Hypomagnesemia: Lowest Mg = 1.6 mg/dL in last 2 days, will replace as needed   # Hypoalbuminemia: Lowest albumin = 2.2 g/dL at 8/5/2024 11:55 AM, will monitor as appropriate     # Hypertension: Noted on problem list          # DMII: A1C = N/A within past 6 months   # Overweight: Estimated body mass index is 25.26 kg/m  as calculated from the following:    Height as of this encounter: 1.753 m (5' 9\").    Weight as of this encounter: 77.6 kg (171 lb 1.2 oz).   # Moderate Malnutrition: based on nutrition assessment    # Financial/Environmental Concerns:                 Disposition Plan     Medically Ready for Discharge: Anticipated in 2-4 Days             Jamshid Gardner MD  Hospitalist Service  [facility]  Securely message with [employer] (more info)  Text page via [employer] Paging/Directory   ______________________________________________________________________    Interval History   Patient seen and examined.  Notes, labs, imaging report personally reviewed.  Patient denied new concern or complaints.  Discussed with nursing staff.  Discussed with care manager and requesting to document will still need semi-electric hospital bed need for the patient.  Discussed with dietitian.  I called patient's son yesterday who did not  the phone, I will try to reach him again today    Physical Exam   Vital Signs: Temp: 97.6  F (36.4  C) Temp src: Oral BP: 134/58 Pulse: 73   Resp: 20 SpO2: 98 % " O2 Device: None (Room air)    Weight: 171 lbs 1.23 oz      General: Not in obvious distress.  HEENT: Normocephalic, supple neck  Chest: Clear to auscultation bilateral anteriorly, no wheezing  Heart: S1S2 normal, regular  Abdomen: Soft.  No significant tenderness, colostomy bag in place  Extremities: Lower extremity swelling improving  Neuro: alert and awake, residual left-sided weakness from previous stroke        Medical Decision Making             Data     I have personally reviewed the following data over the past 24 hrs:    N/A  \   N/A   / N/A     139 97 (L) 14.4 /  75   3.6 36 (H) 0.53 (L) \     INR:  1.80 (H) PTT:  N/A   D-dimer:  N/A Fibrinogen:  N/A       Imaging results reviewed over the past 24 hrs:   No results found for this or any previous visit (from the past 24 hour(s)).   Left leg numbness

## 2025-06-26 NOTE — ED PROVIDER NOTE - PHYSICAL EXAMINATION
Gen: NAD, AOx3  Head: NCAT  HEENT: PERRL, oral mucosa moist, normal conjunctiva, oropharynx clear without exudate or erythema  Lung: CTAB, no respiratory distress, no wheezing, rales, rhonchi  CV: normal s1/s2, rrr, Normal perfusion, pulses 2+ throughout  Abd: soft, NTND, no CVA tenderness  Genitourinary: no pelvic tenderness  MSK: No edema, no visible deformities, full range of motion in all 4 extremities  Neuro: LLE decreased sensation, CN II-XII grossly intact, No focal neurologic deficits, no meningeal signs, no nystagmus/pronator drift, coordination intact, strength 5/5 BUE/BLE  Skin: No rash   Psych: normal affect

## 2025-06-26 NOTE — H&P ADULT - NSICDXPASTMEDICALHX_GEN_ALL_CORE_FT
PAST MEDICAL HISTORY:  Chronic GERD     HLD (hyperlipidemia)     HTN (hypertension)     Myocardial infarct

## 2025-06-26 NOTE — ED PROVIDER NOTE - OBJECTIVE STATEMENT
70-year-old female with a past medical history of hypertension, hyperlipidemia, CAD, diabetes, GERD presents complaining of intermittent left arm pain and left leg numbness for 2 days.  Patient denies any recent fall/injury/trauma. patient mentions to have prior back surgeries.  Patient currently visiting from [place] and has no medical care here. pt denies any other symptoms including fevers, chill, headache, recent illness/travel, cough, abdominal pain, chest pain, or SOB.

## 2025-06-26 NOTE — ED ADULT TRIAGE NOTE - GLASGOW COMA SCALE: BEST VERBAL RESPONSE, MLM
PRE-SEDATION ASSESSMENT    CONSENT  Consent for procedure and sedation obtained: Yes    MEDICAL HISTORY       PHYSICAL EXAM  History and Physical Reviewed: Patient has valid H&P within 30 days. I have reviewed and there are no changes.  Airway Risk History: No previous complications  Airway Anatomy : Class II  Heart : Normal  Lungs : Normal  LOC/Mental Status : Normal    OTHER FINDINGS  Reviewed current medications and allergies: Yes  Pertinent lab/diagnostic test reviewed: Yes    SEDATION RISK ASSESSMENT  Risk Status ASA: Class II - Normal patient with mild systemic disease  Plan for Sedation: Moderate Sedation  Indications for Procedure/Pre-Procedure Diagnosis and Planned Procedure: Renal failure requiring HD presenting for permcath placement  EKG Monitoring: Yes    NARRATIVE FINDINGS      (V5) oriented

## 2025-06-26 NOTE — ED ADULT NURSE NOTE - OBJECTIVE STATEMENT
left arm pain and intermittent left leg numbness since last night. As per patient, it felt like her previous heart attack but denies chest pain this time.

## 2025-06-26 NOTE — ED ADULT TRIAGE NOTE - CHIEF COMPLAINT QUOTE
c/o left arm pain and intermittent left leg numbness since last night. As per patient, it felt like her previous heart attack but denies chest pain this time.

## 2025-06-26 NOTE — H&P ADULT - NSHPPHYSICALEXAM_GEN_ALL_CORE
Used  #[id number]    Physical exam:  General: No acute distress, awake and alert    Neurologic:  -Mental status: Awake, alert, oriented to person, place, year but not month. Speech is fluent with intact naming, repetition, and comprehension, no dysarthria. Recent and remote memory intact. Follows commands. Attention/concentration intact. Fund of knowledge appropriate.  -Cranial nerves:   II: Visual fields are full to confrontation. BTT intact.   III, IV, VI: Extraocular movements are intact without nystagmus. Pupils equally round and reactive to light  V:  Facial sensation V1-V3 equal and intact   VII: Face is symmetric with normal eye closure and smile  XII: Tongue protrudes midline  Motor: Normal bulk and tone. No pronator drift. Strength bilateral upper extremity 5/5, bilateral lower extremities 5/5.  Sensation: decreased LUE/LLE sensation to pin prick and light touch, temperature intact.   Coordination: No dysmetria on finger-to-nose and heel-to-shin bilaterally  Reflexes: hyporeflexic in b/l LEs   Gait: limping, not ataxic, pt states this is baseline     NIHSS 2 for sensation and LOC questions (month)

## 2025-06-26 NOTE — H&P ADULT - NSHPLABSRESULTS_GEN_ALL_CORE
< from: CT Head No Cont (06.26.25 @ 14:33) >    IMPRESSION:    1.  No evidence of acute intracranial pathology.    2.  Moderate/severe chronic microvascular-type changes.    3.  Apparent dilatation of the right vertebral artery, recommend   attention on CTA (scheduled).    < end of copied text >    < from: CT Angio Neck w/ IV Cont (06.26.25 @ 15:52) >      IMPRESSION:    1.  No evidence of major vascular stenosis or occlusion.    2.  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    3.Right thyroid nodule measuring 1.5 cm. Further evaluation with   thyroid sonogram may be obtained on an outpatient/elective basis.    < end of copied text >

## 2025-06-27 ENCOUNTER — APPOINTMENT (OUTPATIENT)
Dept: NEUROLOGY | Facility: HOSPITAL | Age: 70
End: 2025-06-27

## 2025-06-27 ENCOUNTER — RESULT REVIEW (OUTPATIENT)
Age: 70
End: 2025-06-27

## 2025-06-27 LAB
A1C WITH ESTIMATED AVERAGE GLUCOSE RESULT: 10 % — HIGH (ref 4–5.6)
A1C WITH ESTIMATED AVERAGE GLUCOSE RESULT: 9.6 % — HIGH (ref 4–5.6)
ALBUMIN SERPL ELPH-MCNC: 4.3 G/DL — SIGNIFICANT CHANGE UP (ref 3.5–5.2)
ALP SERPL-CCNC: 102 U/L — SIGNIFICANT CHANGE UP (ref 30–115)
ALT FLD-CCNC: 10 U/L — SIGNIFICANT CHANGE UP (ref 0–41)
ANION GAP SERPL CALC-SCNC: 14 MMOL/L — SIGNIFICANT CHANGE UP (ref 7–14)
ANION GAP SERPL CALC-SCNC: 15 MMOL/L — HIGH (ref 7–14)
APTT BLD: 33.2 SEC — SIGNIFICANT CHANGE UP (ref 27–39.2)
AST SERPL-CCNC: 12 U/L — SIGNIFICANT CHANGE UP (ref 0–41)
BILIRUB SERPL-MCNC: 0.3 MG/DL — SIGNIFICANT CHANGE UP (ref 0.2–1.2)
BUN SERPL-MCNC: 28 MG/DL — HIGH (ref 10–20)
BUN SERPL-MCNC: 28 MG/DL — HIGH (ref 10–20)
CALCIUM SERPL-MCNC: 9.4 MG/DL — SIGNIFICANT CHANGE UP (ref 8.4–10.5)
CALCIUM SERPL-MCNC: 9.7 MG/DL — SIGNIFICANT CHANGE UP (ref 8.4–10.5)
CHLORIDE SERPL-SCNC: 100 MMOL/L — SIGNIFICANT CHANGE UP (ref 98–110)
CHLORIDE SERPL-SCNC: 96 MMOL/L — LOW (ref 98–110)
CHOLEST SERPL-MCNC: 239 MG/DL — HIGH
CO2 SERPL-SCNC: 17 MMOL/L — SIGNIFICANT CHANGE UP (ref 17–32)
CO2 SERPL-SCNC: 23 MMOL/L — SIGNIFICANT CHANGE UP (ref 17–32)
CREAT SERPL-MCNC: 0.9 MG/DL — SIGNIFICANT CHANGE UP (ref 0.7–1.5)
CREAT SERPL-MCNC: 1.1 MG/DL — SIGNIFICANT CHANGE UP (ref 0.7–1.5)
EGFR: 54 ML/MIN/1.73M2 — LOW
EGFR: 54 ML/MIN/1.73M2 — LOW
EGFR: 68 ML/MIN/1.73M2 — SIGNIFICANT CHANGE UP
EGFR: 68 ML/MIN/1.73M2 — SIGNIFICANT CHANGE UP
ESTIMATED AVERAGE GLUCOSE: 229 MG/DL — HIGH (ref 68–114)
ESTIMATED AVERAGE GLUCOSE: 240 MG/DL — HIGH (ref 68–114)
GLUCOSE BLDC GLUCOMTR-MCNC: 103 MG/DL — HIGH (ref 70–99)
GLUCOSE BLDC GLUCOMTR-MCNC: 178 MG/DL — HIGH (ref 70–99)
GLUCOSE BLDC GLUCOMTR-MCNC: 253 MG/DL — HIGH (ref 70–99)
GLUCOSE BLDC GLUCOMTR-MCNC: 408 MG/DL — HIGH (ref 70–99)
GLUCOSE BLDC GLUCOMTR-MCNC: 82 MG/DL — SIGNIFICANT CHANGE UP (ref 70–99)
GLUCOSE SERPL-MCNC: 402 MG/DL — HIGH (ref 70–99)
GLUCOSE SERPL-MCNC: 420 MG/DL — HIGH (ref 70–99)
HDLC SERPL-MCNC: 52 MG/DL — SIGNIFICANT CHANGE UP
INR BLD: 0.81 RATIO — SIGNIFICANT CHANGE UP (ref 0.65–1.3)
LDLC SERPL-MCNC: 148 MG/DL — HIGH
LIPID PNL WITH DIRECT LDL SERPL: 148 MG/DL — HIGH
MAGNESIUM SERPL-MCNC: 2 MG/DL — SIGNIFICANT CHANGE UP (ref 1.8–2.4)
NONHDLC SERPL-MCNC: 187 MG/DL — HIGH
POTASSIUM SERPL-MCNC: 5 MMOL/L — SIGNIFICANT CHANGE UP (ref 3.5–5)
POTASSIUM SERPL-MCNC: 5.1 MMOL/L — HIGH (ref 3.5–5)
POTASSIUM SERPL-SCNC: 5 MMOL/L — SIGNIFICANT CHANGE UP (ref 3.5–5)
POTASSIUM SERPL-SCNC: 5.1 MMOL/L — HIGH (ref 3.5–5)
PROT SERPL-MCNC: 6.9 G/DL — SIGNIFICANT CHANGE UP (ref 6–8)
PROTHROM AB SERPL-ACNC: 9.5 SEC — LOW (ref 9.95–12.87)
SODIUM SERPL-SCNC: 132 MMOL/L — LOW (ref 135–146)
SODIUM SERPL-SCNC: 133 MMOL/L — LOW (ref 135–146)
TRIGL SERPL-MCNC: 217 MG/DL — HIGH
TSH SERPL-MCNC: 3.19 UIU/ML — SIGNIFICANT CHANGE UP (ref 0.27–4.2)

## 2025-06-27 PROCEDURE — 36227 PLACE CATH XTRNL CAROTID: CPT | Mod: RT

## 2025-06-27 PROCEDURE — 76377 3D RENDER W/INTRP POSTPROCES: CPT | Mod: 26

## 2025-06-27 PROCEDURE — 36226 PLACE CATH VERTEBRAL ART: CPT | Mod: 50

## 2025-06-27 PROCEDURE — 70551 MRI BRAIN STEM W/O DYE: CPT | Mod: 26

## 2025-06-27 PROCEDURE — 93306 TTE W/DOPPLER COMPLETE: CPT | Mod: 26

## 2025-06-27 PROCEDURE — 36224 PLACE CATH CAROTD ART: CPT | Mod: 50

## 2025-06-27 PROCEDURE — 72141 MRI NECK SPINE W/O DYE: CPT | Mod: 26

## 2025-06-27 RX ORDER — ACETAMINOPHEN 500 MG/5ML
650 LIQUID (ML) ORAL EVERY 6 HOURS
Refills: 0 | Status: DISCONTINUED | OUTPATIENT
Start: 2025-06-27 | End: 2025-06-30

## 2025-06-27 RX ORDER — LABETALOL HYDROCHLORIDE 200 MG/1
5 TABLET, FILM COATED ORAL ONCE
Refills: 0 | Status: COMPLETED | OUTPATIENT
Start: 2025-06-27 | End: 2025-06-27

## 2025-06-27 RX ORDER — ONDANSETRON HCL/PF 4 MG/2 ML
4 VIAL (ML) INJECTION ONCE
Refills: 0 | Status: COMPLETED | OUTPATIENT
Start: 2025-06-27 | End: 2025-06-27

## 2025-06-27 RX ADMIN — Medication 650 MILLIGRAM(S): at 23:16

## 2025-06-27 RX ADMIN — Medication 81 MILLIGRAM(S): at 12:25

## 2025-06-27 RX ADMIN — LOSARTAN POTASSIUM 50 MILLIGRAM(S): 100 TABLET, FILM COATED ORAL at 07:01

## 2025-06-27 RX ADMIN — LABETALOL HYDROCHLORIDE 5 MILLIGRAM(S): 200 TABLET, FILM COATED ORAL at 00:54

## 2025-06-27 RX ADMIN — Medication 4 MILLIGRAM(S): at 21:55

## 2025-06-27 RX ADMIN — Medication 650 MILLIGRAM(S): at 22:34

## 2025-06-27 RX ADMIN — INSULIN LISPRO 12: 100 INJECTION, SOLUTION INTRAVENOUS; SUBCUTANEOUS at 12:23

## 2025-06-27 RX ADMIN — CLOPIDOGREL BISULFATE 75 MILLIGRAM(S): 75 TABLET, FILM COATED ORAL at 12:25

## 2025-06-27 RX ADMIN — ATORVASTATIN CALCIUM 80 MILLIGRAM(S): 80 TABLET, FILM COATED ORAL at 21:54

## 2025-06-27 RX ADMIN — METOPROLOL SUCCINATE 12.5 MILLIGRAM(S): 50 TABLET, EXTENDED RELEASE ORAL at 07:01

## 2025-06-27 RX ADMIN — Medication 20 MILLIGRAM(S): at 07:01

## 2025-06-27 NOTE — OCCUPATIONAL THERAPY INITIAL EVALUATION ADULT - GENERAL OBSERVATIONS, REHAB EVAL
Pt received semi gann in ED3 in bed, + tele, +IV lock, agreeable to OT evaluation, Turkmen  utilized througohut # [id number], left seated at edge of bed with neuroendovascular Dr Hoang present, RN Pedro aware of all, BP within parameters throughout

## 2025-06-27 NOTE — BRIEF OPERATIVE NOTE - OPERATION/FINDINGS
Procedure:  Diagnostic Cerebral Angiogram   Contrast: Visipaque 320   Medication:  Verapamil 2.5mg, Nitroglycerin 200 mcg IA; Heparin 50 IU/kg body weight IV; see also anesthesiology note  Implants placed: None  Complications: None    Preliminary Report:  A selective diagnostic cerebral angiogram was performed. On preliminary review, a right VA fusiform aneurysm was identified. A right radial arteriotomy site was used and a TR band was applied at closure.    Please continue to monitor the arteriotomy site for signs of hematoma/ bleeding/ infection and for diminished or absent distal pulses or temperature/ color changes.   NIHSS pre procedure: 0  NIHSS post procedure: 0  Extremities: RUE remains c/d/i, nontender, no hematoma, no continued bleeding, no color or temperature changes, +radial pulse    Official IR neuro procedure note is to follow.

## 2025-06-27 NOTE — OCCUPATIONAL THERAPY INITIAL EVALUATION ADULT - GROSSLY INTACT, SENSORY
pt reports that LUE numbness that was present on admission is not currently present but does intermittently return/Left UE/Right UE/Grossly Intact

## 2025-06-27 NOTE — PATIENT PROFILE ADULT - FALL HARM RISK - HARM RISK INTERVENTIONS
Assistance with ambulation/Assistance OOB with selected safe patient handling equipment/Communicate Risk of Fall with Harm to all staff/Discuss with provider need for PT consult/Monitor for mental status changes/Monitor gait and stability/Provide patient with walking aids - walker, cane, crutches/Reinforce activity limits and safety measures with patient and family/Tailored Fall Risk Interventions/Use of alarms - bed, chair and/or voice tab/Visual Cue: Yellow wristband and red socks/Bed in lowest position, wheels locked, appropriate side rails in place/Call bell, personal items and telephone in reach/Instruct patient to call for assistance before getting out of bed or chair/Non-slip footwear when patient is out of bed/[place] to call system/Physically safe environment - no spills, clutter or unnecessary equipment/Purposeful Proactive Rounding/Room/bathroom lighting operational, light cord in reach

## 2025-06-27 NOTE — PATIENT PROFILE ADULT - TOBACCO USE
M follow up call made:No answer. Left message with return call information.  If no return call, will follow up at a later date.     Received a return call from the patient who just received a treatment today. She will have a PET scan on Monday to assess if the treatments are working. She reported doing ok, just tired at times.  ACM instructed to call into the office for any immediate questions or concerns.  RN will follow up at a later time.    Maggie Escobedo RN, BSN  Ambulatory Care Manager  [phone]       Never smoker

## 2025-06-27 NOTE — OCCUPATIONAL THERAPY INITIAL EVALUATION ADULT - LIVES WITH, PROFILE
pt lives in [place] with spouse, however frequently visits dtr here, has been staying with daughter and will stay with her on d/c, apartment, ground level, no stairs in or out

## 2025-06-27 NOTE — ASU PATIENT PROFILE, ADULT - FALL HARM RISK - UNIVERSAL INTERVENTIONS
Bed in lowest position, wheels locked, appropriate side rails in place/Call bell, personal items and telephone in reach/Instruct patient to call for assistance before getting out of bed or chair/Non-slip footwear when patient is out of bed/[place] to call system/Physically safe environment - no spills, clutter or unnecessary equipment/Purposeful Proactive Rounding/Room/bathroom lighting operational, light cord in reach

## 2025-06-27 NOTE — CONSULT NOTE ADULT - SUBJECTIVE AND OBJECTIVE BOX
Neuroendovascular Consult:   Consulted for:  Aneurysm     HPI:  71 y/o F with a PMHx of HTN, HLD, CAD (supposed ot be on ASA 81mg once daily), DM, GERD, chronic back issues with back surgery in [place], [place] 5 years ago, presenting to the ED with subjective decreased sensation in the LUE/LLE. Pt states when she woke up 2 days ago, her LLE felt different to her and numb, and when she woke up yesterday, her LUE felt numb in addition to her LLE. She came to the ED with concerns for another MI as she states she felt L sided numbness in her arm and leg at that time too. Pt has not taken any of her medication in the past month because she is visiting [place] and cannot fill her prescription. /90. Denies accompanying speech issues, vision changes, facial involvement, extremity weakness, dizziness. Endorses that her gait has been abnormal for the past couple of years due to her back surgery, however does not feel her gait worsened. Denies known hx of stroke, TIA, seizure, neck pain.  (26 Jun 2025 19:36)    Interval HPI: A neuroendovascular consult was placed for evaluation of a right VA aneurysm on CTA head/ neck. The patient is Bulgarian speaking and an  was used via [employer] for this encounter (#documented on the procedural consent form). The patient has no acute complaints.    PAST MEDICAL & SURGICAL HISTORY:  HTN (hypertension)  HLD (hyperlipidemia)  Myocardial infarct  Chronic GERD  Status post lumbar surgery    Pertinent PMHx    MEDICATIONS  (STANDING):  aspirin enteric coated 81 milliGRAM(s) Oral daily  atorvastatin 80 milliGRAM(s) Oral at bedtime  clopidogrel Tablet 75 milliGRAM(s) Oral daily  dextrose 5%. 1000 milliLiter(s) (50 mL/Hr) IV Continuous <Continuous>  dextrose 5%. 1000 milliLiter(s) (100 mL/Hr) IV Continuous <Continuous>  dextrose 50% Injectable 25 Gram(s) IV Push once  dextrose 50% Injectable 12.5 Gram(s) IV Push once  dextrose 50% Injectable 25 Gram(s) IV Push once  enoxaparin Injectable 40 milliGRAM(s) SubCutaneous every 24 hours  famotidine    Tablet 20 milliGRAM(s) Oral two times a day  glucagon  Injectable 1 milliGRAM(s) IntraMuscular once  insulin lispro (ADMELOG) corrective regimen sliding scale   SubCutaneous three times a day before meals  losartan 50 milliGRAM(s) Oral daily  melatonin 5 milliGRAM(s) Oral at bedtime  metoprolol tartrate 12.5 milliGRAM(s) Oral two times a day    MEDICATIONS  (PRN):  dextrose Oral Gel 15 Gram(s) Oral once PRN Blood Glucose LESS THAN 70 milliGRAM(s)/deciliter    Allergies  No Known Allergies  Intolerances    Physical Exam:   Vital Signs Last 24 Hrs  T(C): 36.2 (27 Jun 2025 19:50), Max: 36.8 (26 Jun 2025 20:41)  T(F): 97.1 (27 Jun 2025 19:50), Max: 98.2 (26 Jun 2025 20:41)  HR: 64 (27 Jun 2025 19:50) (54 - 77)  BP: 140/63 (27 Jun 2025 19:50) (90/52 - 171/84)  BP(mean): --  RR: 16 (27 Jun 2025 19:50) (15 - 20)  SpO2: 98% (27 Jun 2025 19:50) (96% - 99%)    Parameters below as of 27 Jun 2025 19:50  Patient On (Oxygen Delivery Method): room air    General: NAD  Neuro: AAOx3, following commands, PERRLA, EOMI, visual fields full, SAUNDERS AG without drift, sensation intact to light touch (had presented w/ LUE/ LLE numbness, which she now denies having), no dysarthria, no aphasia, no dysmetria on FTN or HTS testing, V1-2 intact and symmetrical.  NIHSS 0    Labs:                         12.5   9.29  )-----------( 278      ( 26 Jun 2025 14:04 )             36.8     06-27    132[L]  |  100  |  28[H]  ----------------------------<  402[H]  5.1[H]   |  17  |  0.9    Ca    9.4      27 Jun 2025 11:56  Mg     2.0     06-27    TPro  6.9  /  Alb  4.3  /  TBili  0.3  /  DBili  x   /  AST  12  /  ALT  10  /  AlkPhos  102  06-27    PT/INR - ( 27 Jun 2025 11:56 )   PT: 9.50 sec;   INR: 0.81 ratio         PTT - ( 27 Jun 2025 11:56 )  PTT:33.2 sec    Pertinent labs:                      12.5   9.29  )-----------( 278      ( 26 Jun 2025 14:04 )             36.8       06-27    132[L]  |  100  |  28[H]  ----------------------------<  402[H]  5.1[H]   |  17  |  0.9    Ca    9.4      27 Jun 2025 11:56  Mg     2.0     06-27    TPro  6.9  /  Alb  4.3  /  TBili  0.3  /  DBili  x   /  AST  12  /  ALT  10  /  AlkPhos  102  06-27      PT/INR - ( 27 Jun 2025 11:56 )   PT: 9.50 sec;   INR: 0.81 ratio         PTT - ( 27 Jun 2025 11:56 )  PTT:33.2 sec    Radiology & Additional Studies:   Radiology imaging reviewed.     Assessment:   70-year-old female with a PMHx of HTN, HLD, CAD, DM, GERD, chronic back pain, MI, who presented with decreased LUE/ LLE sensation. A neuroendovascular consult was placed for evaluation of a right VA aneurysm on CTA head/ neck. The patient is Bulgarian speaking and an  was used via [employer] for this encounter (#documented on the procedural consent form). The patient has no acute complaints.    Suggestions:   - Diagnostic cerebral angiogram to evaluate the aneurysm (consent obtained from the patient in Bulgarian and the patient's daughter and  were notified at the patient's request)  - MRI brain prior to the above procedure   - Continued management by the neurovascular team   - Plan to follow up outpatient with Dr Hoang after the diagnostic cerebral angiogram. No acute intervention is planned for the VA aneurysm on this admission.  [id number] Neuroendovascular with additional questions on this admission

## 2025-06-27 NOTE — PRE PROCEDURE NOTE - PRE PROCEDURE EVALUATION
Interventional Neuro Radiology  Pre-Procedure Note ANNETTE    HPI:  69 y/o F with a PMHx of HTN, HLD, CAD (supposed ot be on ASA 81mg once daily), DM, GERD, chronic back issues with back surgery in [place], MI in [place] 5 years ago, presenting to the ED with subjective decreased sensation in the LUE/LLE. Pt states when she woke up 2 days ago, her LLE felt different to her and numb, and when she woke up yesterday, her LUE felt numb in addition to her LLE. She came to the ED with concerns for another MI as she states she felt L sided numbness in her arm and leg at that time too. Pt has not taken any of her medication in the past month because she is visiting [place] and cannot fill her prescription. /90. Denies accompanying speech issues, vision changes, facial involvement, extremity weakness, dizziness. Endorses that her gait has been abnormal for the past couple of years due to her back surgery, however does not feel her gait worsened. Denies known hx of stroke, TIA, seizure, neck pain.  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 The patient was NPO except medication after midnight last night. NIHSS 2 for LUE/ LLE sensory deficit. The patient's daughter was contacted regarding the procedure and the patient spoke with her  by phone.     Allergies: No Known Allergies    PAST MEDICAL & SURGICAL HISTORY:  HTN (hypertension)  HLD (hyperlipidemia)  Myocardial infarct  Chronic GERD  Status post lumbar surgery    Current Medications: aspirin enteric coated 81 milliGRAM(s) Oral daily  atorvastatin 80 milliGRAM(s) Oral at bedtime  clopidogrel Tablet 75 milliGRAM(s) Oral daily  dextrose 5%. 1000 milliLiter(s) IV Continuous <Continuous>  dextrose 5%. 1000 milliLiter(s) IV Continuous <Continuous>  dextrose 50% Injectable 25 Gram(s) IV Push once  dextrose 50% Injectable 12.5 Gram(s) IV Push once  dextrose 50% Injectable 25 Gram(s) IV Push once  dextrose Oral Gel 15 Gram(s) Oral once PRN  enoxaparin Injectable 40 milliGRAM(s) SubCutaneous every 24 hours  famotidine    Tablet 20 milliGRAM(s) Oral two times a day  glucagon  Injectable 1 milliGRAM(s) IntraMuscular once  insulin lispro (ADMELOG) corrective regimen sliding scale   SubCutaneous three times a day before meals  losartan 50 milliGRAM(s) Oral daily  melatonin 5 milliGRAM(s) Oral at bedtime  metoprolol tartrate 12.5 milliGRAM(s) Oral two times a day    Labs:                         12.5   9.29  )-----------( 278      ( 26 Jun 2025 14:04 )             36.8       06-27    132[L]  |  100  |  28[H]  ----------------------------<  402[H]  5.1[H]   |  17  |  0.9    Ca    9.4      27 Jun 2025 11:56  Mg     2.0     06-27    TPro  6.9  /  Alb  4.3  /  TBili  0.3  /  DBili  x   /  AST  12  /  ALT  10  /  AlkPhos  102  06-27          Blood Bank: 06-27-25  --  A POS  --    Assessment/Plan:   This is a 70-year-old female who presents for a diagnostic cerebral angiogram.  Procedure, goals, risks, benefits and alternatives  were discussed with patient and patient's family.  All questions were answered to best understanding.   Risks discussed include but are not limited to stroke, vessel injury, hemorrhage, and/or hematoma.  Patient demonstrates understanding  of all risks involved with this procedure and wishes to continue.     Appropriate consent was obtained from patient and consent is in the patient's chart.

## 2025-06-27 NOTE — PHYSICAL THERAPY INITIAL EVALUATION ADULT - LIVES WITH, PROFILE
presently in [facility] visiting family. Has no steps to enter ( apartment building with elevator). Pt originally from [place]./children/spouse

## 2025-06-27 NOTE — PROGRESS NOTE ADULT - SUBJECTIVE AND OBJECTIVE BOX
--------INCOMPLETE Stroke Progress Note--------    INTERVAL HPI / OVERNIGHT EVENTS:  Overnight, no acute events.  Today, patient was seen and examined.  number ______ used.      Vital Signs Last 24 Hrs  T(C): 36.3 (27 Jun 2025 00:15), Max: 36.8 (26 Jun 2025 12:23)  T(F): 97.4 (27 Jun 2025 00:15), Max: 98.2 (26 Jun 2025 12:23)  HR: 73 (27 Jun 2025 03:57) (64 - 83)  BP: 120/70 (27 Jun 2025 03:57) (120/70 - 180/90)  BP(mean): --  RR: 18 (27 Jun 2025 03:57) (18 - 20)  SpO2: 98% (27 Jun 2025 03:57) (97% - 99%)    Parameters below as of 27 Jun 2025 03:57  Patient On (Oxygen Delivery Method): room air      Physical exam:  General: No acute distress, awake and alert  Neurologic:  -Mental status: Awake, alert, oriented to person, place, year but not month. Speech is fluent with intact naming, repetition, and comprehension, no dysarthria. Recent and remote memory intact. Follows commands. Attention/concentration intact. Fund of knowledge appropriate.  -Cranial nerves:   II: Visual fields are full to confrontation. BTT intact.   III, IV, VI: Extraocular movements are intact without nystagmus. Pupils equally round and reactive to light  V:  Facial sensation V1-V3 equal and intact   VII: Face is symmetric with normal eye closure and smile  XII: Tongue protrudes midline  Motor: Normal bulk and tone. No pronator drift. Strength bilateral upper extremity 5/5, bilateral lower extremities 5/5.  Sensation: decreased LUE/LLE sensation to pin prick and light touch, temperature intact.   Coordination: No dysmetria on finger-to-nose and heel-to-shin bilaterally  Reflexes: hyporeflexic in b/l LEs   Gait: limping, not ataxic, pt states this is baseline     NIHSS 2 for sensation and LOC questions (month)      LABS:                        12.5   9.29  )-----------( 278      ( 26 Jun 2025 14:04 )             36.8     06-26    132[L]  |  97[L]  |  26[H]  ----------------------------<  285[H]  4.8   |  22  |  1.0    Ca    9.8      26 Jun 2025 14:04    TPro  7.3  /  Alb  4.4  /  TBili  0.2  /  DBili  x   /  AST  12  /  ALT  12  /  AlkPhos  106  06-26    PT/INR - ( 26 Jun 2025 14:04 )   PT: 9.20 sec;   INR: 0.78 ratio    PTT - ( 26 Jun 2025 14:04 )  PTT:30.7 sec      MEDICATIONS  (STANDING):  aspirin enteric coated 81 milliGRAM(s) Oral daily  atorvastatin 80 milliGRAM(s) Oral at bedtime  clopidogrel Tablet 75 milliGRAM(s) Oral daily  dextrose 5%. 1000 milliLiter(s) (50 mL/Hr) IV Continuous <Continuous>  dextrose 5%. 1000 milliLiter(s) (100 mL/Hr) IV Continuous <Continuous>  dextrose 50% Injectable 25 Gram(s) IV Push once  dextrose 50% Injectable 12.5 Gram(s) IV Push once  dextrose 50% Injectable 25 Gram(s) IV Push once  enoxaparin Injectable 40 milliGRAM(s) SubCutaneous every 24 hours  famotidine    Tablet 20 milliGRAM(s) Oral two times a day  glucagon  Injectable 1 milliGRAM(s) IntraMuscular once  insulin lispro (ADMELOG) corrective regimen sliding scale   SubCutaneous three times a day before meals  losartan 50 milliGRAM(s) Oral daily  melatonin 5 milliGRAM(s) Oral at bedtime  metoprolol tartrate 12.5 milliGRAM(s) Oral two times a day    MEDICATIONS  (PRN):  dextrose Oral Gel 15 Gram(s) Oral once PRN Blood Glucose LESS THAN 70 milliGRAM(s)/deciliter    Allergies    No Known Allergies    Intolerances       --------Stroke Progress Note--------    INTERVAL HPI / OVERNIGHT EVENTS:  Overnight, no acute events.  Today, patient was seen and examined.  number [id number]. Patient states       Vital Signs Last 24 Hrs  T(C): 36.3 (27 Jun 2025 00:15), Max: 36.8 (26 Jun 2025 12:23)  T(F): 97.4 (27 Jun 2025 00:15), Max: 98.2 (26 Jun 2025 12:23)  HR: 73 (27 Jun 2025 03:57) (64 - 83)  BP: 120/70 (27 Jun 2025 03:57) (120/70 - 180/90)  BP(mean): --  RR: 18 (27 Jun 2025 03:57) (18 - 20)  SpO2: 98% (27 Jun 2025 03:57) (97% - 99%)    Parameters below as of 27 Jun 2025 03:57  Patient On (Oxygen Delivery Method): room air      Physical exam:  General: No acute distress, awake and alert  Neurologic:  -Mental status: Awake, alert, oriented to person, place, month, and year. Speech is fluent with intact naming, repetition, and comprehension, no dysarthria. Recent and remote memory intact. Follows commands. Attention/concentration intact. Fund of knowledge appropriate.  -Cranial nerves:   II: Visual fields are full to confrontation. BTT intact.   III, IV, VI: Extraocular movements are intact without nystagmus. Pupils equally round and reactive to light  V:  Facial sensation V1-V3 equal and intact   VII: Face is symmetric with normal eye closure and smile  XII: Tongue protrudes midline  Motor: Normal bulk and tone. No pronator drift. Strength bilateral upper extremity 5/5, bilateral lower extremities 5/5.  Sensation: decreased LUE/LLE sensation to pin prick and light touch, temperature intact.   Coordination: No dysmetria on finger-to-nose and heel-to-shin bilaterally  Reflexes: hyporeflexic in b/l LEs   Gait: limping, not ataxic, pt states this is baseline     NIHSS: 1 (LUE & LLE sensory)      LABS:                        12.5   9.29  )-----------( 278      ( 26 Jun 2025 14:04 )             36.8     06-26    132[L]  |  97[L]  |  26[H]  ----------------------------<  285[H]  4.8   |  22  |  1.0    Ca    9.8      26 Jun 2025 14:04    TPro  7.3  /  Alb  4.4  /  TBili  0.2  /  DBili  x   /  AST  12  /  ALT  12  /  AlkPhos  106  06-26    PT/INR - ( 26 Jun 2025 14:04 )   PT: 9.20 sec;   INR: 0.78 ratio    PTT - ( 26 Jun 2025 14:04 )  PTT:30.7 sec      MEDICATIONS  (STANDING):  aspirin enteric coated 81 milliGRAM(s) Oral daily  atorvastatin 80 milliGRAM(s) Oral at bedtime  clopidogrel Tablet 75 milliGRAM(s) Oral daily  dextrose 5%. 1000 milliLiter(s) (50 mL/Hr) IV Continuous <Continuous>  dextrose 5%. 1000 milliLiter(s) (100 mL/Hr) IV Continuous <Continuous>  dextrose 50% Injectable 25 Gram(s) IV Push once  dextrose 50% Injectable 12.5 Gram(s) IV Push once  dextrose 50% Injectable 25 Gram(s) IV Push once  enoxaparin Injectable 40 milliGRAM(s) SubCutaneous every 24 hours  famotidine    Tablet 20 milliGRAM(s) Oral two times a day  glucagon  Injectable 1 milliGRAM(s) IntraMuscular once  insulin lispro (ADMELOG) corrective regimen sliding scale   SubCutaneous three times a day before meals  losartan 50 milliGRAM(s) Oral daily  melatonin 5 milliGRAM(s) Oral at bedtime  metoprolol tartrate 12.5 milliGRAM(s) Oral two times a day    MEDICATIONS  (PRN):  dextrose Oral Gel 15 Gram(s) Oral once PRN Blood Glucose LESS THAN 70 milliGRAM(s)/deciliter    Allergies    No Known Allergies    Intolerances       --------Stroke Progress Note--------    INTERVAL HPI / OVERNIGHT EVENTS:  Overnight, no acute events.  Today, patient was seen and examined.  number [id number]. Patient still has numbness and tingling of the left arm and leg. She reportedly takes all medications, including aspirin.       Vital Signs Last 24 Hrs  T(C): 36.3 (27 Jun 2025 00:15), Max: 36.8 (26 Jun 2025 12:23)  T(F): 97.4 (27 Jun 2025 00:15), Max: 98.2 (26 Jun 2025 12:23)  HR: 73 (27 Jun 2025 03:57) (64 - 83)  BP: 120/70 (27 Jun 2025 03:57) (120/70 - 180/90)  BP(mean): --  RR: 18 (27 Jun 2025 03:57) (18 - 20)  SpO2: 98% (27 Jun 2025 03:57) (97% - 99%)    Parameters below as of 27 Jun 2025 03:57  Patient On (Oxygen Delivery Method): room air      Physical exam:  General: No acute distress, awake and alert  Neurologic:  -Mental status: Awake, alert, oriented to person, place, month, and year. Speech is fluent with intact naming, repetition, and comprehension, no dysarthria. Recent and remote memory intact. Follows commands. Attention/concentration intact. Fund of knowledge appropriate.  -Cranial nerves:   II: Visual fields are full to confrontation. BTT intact.   III, IV, VI: Extraocular movements are intact without nystagmus. Pupils equally round and reactive to light  V:  Facial sensation V1-V3 equal and intact   VII: Face is symmetric with normal eye closure and smile  XII: Tongue protrudes midline  Motor: Normal bulk and tone. No pronator drift. Strength bilateral upper extremity 5/5, bilateral lower extremities 5/5.  Sensation: decreased LUE/LLE sensation to pin prick and light touch, temperature intact.   Coordination: No dysmetria on finger-to-nose and heel-to-shin bilaterally  Reflexes: hyporeflexic in b/l LEs   Gait: limping, not ataxic, pt states this is baseline     NIHSS: 1 (LUE & LLE sensory)      LABS:                        12.5   9.29  )-----------( 278      ( 26 Jun 2025 14:04 )             36.8     06-26    132[L]  |  97[L]  |  26[H]  ----------------------------<  285[H]  4.8   |  22  |  1.0    Ca    9.8      26 Jun 2025 14:04    TPro  7.3  /  Alb  4.4  /  TBili  0.2  /  DBili  x   /  AST  12  /  ALT  12  /  AlkPhos  106  06-26    PT/INR - ( 26 Jun 2025 14:04 )   PT: 9.20 sec;   INR: 0.78 ratio    PTT - ( 26 Jun 2025 14:04 )  PTT:30.7 sec      MEDICATIONS  (STANDING):  aspirin enteric coated 81 milliGRAM(s) Oral daily  atorvastatin 80 milliGRAM(s) Oral at bedtime  clopidogrel Tablet 75 milliGRAM(s) Oral daily  dextrose 5%. 1000 milliLiter(s) (50 mL/Hr) IV Continuous <Continuous>  dextrose 5%. 1000 milliLiter(s) (100 mL/Hr) IV Continuous <Continuous>  dextrose 50% Injectable 25 Gram(s) IV Push once  dextrose 50% Injectable 12.5 Gram(s) IV Push once  dextrose 50% Injectable 25 Gram(s) IV Push once  enoxaparin Injectable 40 milliGRAM(s) SubCutaneous every 24 hours  famotidine    Tablet 20 milliGRAM(s) Oral two times a day  glucagon  Injectable 1 milliGRAM(s) IntraMuscular once  insulin lispro (ADMELOG) corrective regimen sliding scale   SubCutaneous three times a day before meals  losartan 50 milliGRAM(s) Oral daily  melatonin 5 milliGRAM(s) Oral at bedtime  metoprolol tartrate 12.5 milliGRAM(s) Oral two times a day    MEDICATIONS  (PRN):  dextrose Oral Gel 15 Gram(s) Oral once PRN Blood Glucose LESS THAN 70 milliGRAM(s)/deciliter    Allergies    No Known Allergies    Intolerances

## 2025-06-27 NOTE — CONSULT NOTE ADULT - SUBJECTIVE AND OBJECTIVE BOX
HPI:  69 y/o F with a PMHx of HTN, HLD, CAD (supposed ot be on ASA 81mg once daily), DM, GERD, chronic back issues with back surgery in [place], [place] in [place] 5 years ago, presenting to the ED with subjective decreased sensation in the LUE/LLE. Pt states when she woke up 2 days ago, her LLE felt different to her and numb, and when she woke up yesterday, her LUE felt numb in addition to her LLE. She came to the ED with concerns for another MI as she states she felt L sided numbness in her arm and leg at that time too. Pt has not taken any of her medication in the past month because she is visiting [place] and cannot fill her prescription. /90. Denies accompanying speech issues, vision changes, facial involvement, extremity weakness, dizziness. Endorses that her gait has been abnormal for the past couple of years due to her back surgery, however does not feel her gait worsened. Denies known hx of stroke, TIA, seizure, neck pain.      < from: CT Head No Cont (06.26.25 @ 14:33) >  IMPRESSION:    1.  No evidence of acute intracranial pathology.    2.  Moderate/severe chronic microvascular-type changes.    3.  Apparent dilatation of the right vertebral artery, recommend   attention on CTA (scheduled).    < end of copied text >        PAST MEDICAL & SURGICAL HISTORY:  HTN (hypertension)      HLD (hyperlipidemia)      Myocardial infarct      Chronic GERD      Status post lumbar surgery          Hospital Course:    TODAY'S SUBJECTIVE & REVIEW OF SYMPTOMS:     Constitutional WNL   Cardio WNL   Resp WNL   GI WNL  Heme WNL  Endo WNL  Skin WNL  MSK WNL  Neuro left side numbness   Cognitive WNL  Psych WNL      MEDICATIONS  (STANDING):  aspirin enteric coated 81 milliGRAM(s) Oral daily  atorvastatin 80 milliGRAM(s) Oral at bedtime  clopidogrel Tablet 75 milliGRAM(s) Oral daily  dextrose 5%. 1000 milliLiter(s) (50 mL/Hr) IV Continuous <Continuous>  dextrose 5%. 1000 milliLiter(s) (100 mL/Hr) IV Continuous <Continuous>  dextrose 50% Injectable 25 Gram(s) IV Push once  dextrose 50% Injectable 12.5 Gram(s) IV Push once  dextrose 50% Injectable 25 Gram(s) IV Push once  enoxaparin Injectable 40 milliGRAM(s) SubCutaneous every 24 hours  famotidine    Tablet 20 milliGRAM(s) Oral two times a day  glucagon  Injectable 1 milliGRAM(s) IntraMuscular once  insulin lispro (ADMELOG) corrective regimen sliding scale   SubCutaneous three times a day before meals  losartan 50 milliGRAM(s) Oral daily  melatonin 5 milliGRAM(s) Oral at bedtime  metoprolol tartrate 12.5 milliGRAM(s) Oral two times a day    MEDICATIONS  (PRN):  dextrose Oral Gel 15 Gram(s) Oral once PRN Blood Glucose LESS THAN 70 milliGRAM(s)/deciliter      FAMILY HISTORY:      Allergies    No Known Allergies    Intolerances        SOCIAL HISTORY:    [  ] Etoh  [  ] Smoking  [  ] Substance abuse     Home Environment:  [   ] Home Alone  [ x  ] Lives with Family  [   ] Home Health Aid    Dwelling:  [   ] Apartment  [  x ] Private House  [   ] Adult Home  [   ] Skilled Nursing Facility      [   ] Short Term  [   ] Long Term  [ x  ] Stairs       Elevator [   ]    FUNCTIONAL STATUS PTA: (Check all that apply)  Ambulation: [ x   ]Independent    [   ] Dependent     [   ] Non-Ambulatory  Assistive Device: [   ] SA Cane  [   ]  Q Cane  [   ] Walker  [   ]  Wheelchair  ADL : [ x  ] Independent  [    ]  Dependent       Vital Signs Last 24 Hrs  T(C): 36.2 (27 Jun 2025 18:35), Max: 36.8 (26 Jun 2025 20:41)  T(F): 97.1 (27 Jun 2025 18:35), Max: 98.2 (26 Jun 2025 20:41)  HR: 56 (27 Jun 2025 18:35) (54 - 77)  BP: 113/66 (27 Jun 2025 18:35) (90/52 - 171/84)  BP(mean): --  RR: 16 (27 Jun 2025 18:35) (15 - 20)  SpO2: 99% (27 Jun 2025 18:35) (96% - 99%)    Parameters below as of 27 Jun 2025 18:35  Patient On (Oxygen Delivery Method): room air          PHYSICAL EXAM: Awake & Alert  GENERAL: NAD  HEAD:  Normocephalic  CHEST/LUNG: Clear   HEART: S1S2+  ABDOMEN: Soft, Nontender  EXTREMITIES:  no calf tenderness    NERVOUS SYSTEM:  Cranial Nerves 2-12 intact [   ] Abnormal  [   ]  ROM: WFL all extremities [ x  ]  Abnormal [   ]  Motor Strength: WFL all extremities  [ x  ]  Abnormal [   ]  Sensation: intact to light touch [ x ] Abnormal [   ]    FUNCTIONAL STATUS:  Bed Mobility: Independent [   ]  Supervision [ x  ]  Needs Assistance [   ]  N/A [   ]  Transfers: Independent [   ]  Supervision [x   ]  Needs Assistance [   ]  N/A [   ]   Ambulation: Independent [   ]  Supervision [x   ]  Needs Assistance [   ]  N/A [   ]  ADL: Independent [   ] Requires Assistance [   ] N/A [   ]      LABS:                        12.5   9.29  )-----------( 278      ( 26 Jun 2025 14:04 )             36.8     06-27    132[L]  |  100  |  28[H]  ----------------------------<  402[H]  5.1[H]   |  17  |  0.9    Ca    9.4      27 Jun 2025 11:56  Mg     2.0     06-27    TPro  6.9  /  Alb  4.3  /  TBili  0.3  /  DBili  x   /  AST  12  /  ALT  10  /  AlkPhos  102  06-27    PT/INR - ( 27 Jun 2025 11:56 )   PT: 9.50 sec;   INR: 0.81 ratio         PTT - ( 27 Jun 2025 11:56 )  PTT:33.2 sec  Urinalysis Basic - ( 27 Jun 2025 11:56 )    Color: x / Appearance: x / SG: x / pH: x  Gluc: 402 mg/dL / Ketone: x  / Bili: x / Urobili: x   Blood: x / Protein: x / Nitrite: x   Leuk Esterase: x / RBC: x / WBC x   Sq Epi: x / Non Sq Epi: x / Bacteria: x        RADIOLOGY & ADDITIONAL STUDIES:

## 2025-06-27 NOTE — CONSULT NOTE ADULT - ASSESSMENT
IMPRESSION: Rehab of r/o stroke / left side numbness /  HTN, HLD, CAD (supposed ot be on ASA 81mg once daily), DM, GERD, chronic back    PRECAUTIONS: [   ] Cardiac  [   ] Respiratory  [   ] Seizures [   ] Contact Isolation  [   ] Droplet Isolation  [   ] Other    Weight Bearing Status:     RECOMMENDATION:    Out of Bed to Chair     DVT/Decubiti Prophylaxis    REHAB PLAN:     [ x   ] Bedside P/T 3-5 times a week   [  x  ]   Bedside O/T  2-3 times a week             [    ] Speech Therapy               [    ]  No Rehab Therapy Indicated   Conditioning/ROM                                    ADL  Bed Mobility                                               Conditioning/ROM  Transfers                                                     Bed Mobility  Sitting /Standing Balance                         Transfers                                        Gait Training                                               Sitting/Standing Balance  Stair Training [   ]Applicable                    Home equipment Eval                                                                        Splinting  [   ] Only      GOALS:   ADL   [ x   ]   Independent                    Transfers  [  x  ] Independent                          Ambulation  [ x   ] Independent     [   x  ] With device                            [    ]  CG                                                         [    ]  CG                                                                  [    ] CG                            [    ] Min A                                                   [    ] Min A                                                              [    ] Min  A          DISCHARGE PLAN:   [    ]  Good candidate for Intensive Rehabilitation/Hospital based                                             Will tolerate 3hrs Intensive Rehab Daily                                       [     ]  Short Term Rehab in Skilled Nursing Facility                                       [   x  ]  Home with Outpatient or VN services                                         [     ]  Possible Candidate for Intensive Hospital based Rehab

## 2025-06-27 NOTE — OCCUPATIONAL THERAPY INITIAL EVALUATION ADULT - PERTINENT HX OF CURRENT PROBLEM, REHAB EVAL
71 y/o F with a PMHx of HTN, HLD, CAD (supposed to be on ASA 81mg once daily), DM, GERD, chronic back issues with back surgery in [place],STEMI in [place] 5 years ago, presenting to the ED with subjective decreased sensation starting in the LLE x 2 days ago and in the LUE yesterday. NIHSS 2 for decreased sensation in LUE/LLE (face sparing), and LOC month. 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 Admitted to stroke unit for suspected stroke.     #Possible R sided lacunar infarct

## 2025-06-27 NOTE — PROGRESS NOTE ADULT - ASSESSMENT
71 y/o F with a PMHx of HTN, HLD, CAD (supposed to be on ASA 81mg once daily), DM, GERD, chronic back issues with back surgery in [place],STEMI in [place] 5 years ago, presenting to the ED with subjective decreased sensation starting in the LLE x 2 days ago and in the LUE yesterday. NIHSS 2 for decreased sensation in LUE/LLE (face sparing), and LOC month. 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 Admitted to stroke unit for suspected stroke.     Plan:    # Possible R sided lacunar infarct   - Home antiplatelets:  - Tele monitoring  - Neurochecks and vital signs q8h  - Systolic BP goal: permissive [id number] for first 24hs after initial symptom onset; then systolic goal: 120-180  - Finger stick goal of 120-180  - Aspirin 81mg and plavix 75mg daily  - Atorvastatin 80mg daily  - Obtain MRI Brain Non-con  - Obtain TTE w/ bubbles (if less than 59yo)  - Obtain HbA1c, TSH and Lipid panel  - Obtain Utox  - Physical therapy/Occupational therapy/Speech and Swallow/Physiatry eval  - Fall and Aspiration precautions  - Provide stroke education    # HTN  # CAD  - Goal -160   - obtain TTE with bubble  - c/w home Metoprolol and Losartan    # 7mm aneurysm   - LISA consult   - NPO after midnight for DSA    # DM  - f/u A1c  - Started sliding scale. Monitor FS and adjust insulin as needed.     # HLD  - high dose statin     # MISC  - DVT Prophylaxix: Lovenox sq, SCDs for DVT prophylaxis   - Melatonin 5mg at night     #Misc:  - DVT Prophylaxis: SCD, lovenox  - Diet: NPO until s/s eval  - GI Prophylaxis: NI  - Activity: IAT  - Code status: Full code  - Dispo: Stroke Unit     70F, Turkmen-speaking, PMHx of HTN, HLD, CAD (supposed to be on ASA 81mg once daily), DM, GERD, chronic back issues with back surgery in [place], [place] 5 years ago, p/w left-sided numbness and tingling for 2 days. CTH negative for acute stroke, CTA with 7mm fusiform aneurysm of the Right V4. Admitted to stroke unit for further workup.    Plan:    # Possible R sided lacunar infarct   - Home antiplatelets: aspirin 81mg qD  - Tele monitoring  - Neurochecks and vital signs q8h  - Systolic BP goal: 120-160  - Finger stick goal of 120-180  - Aspirin 81mg and Plavix 75mg daily  - Atorvastatin 80mg daily  - Obtain MRI Brain Non-con  - Obtain TTE  - F/u HbA1c, TSH and Lipid panel  - Physical therapy/Occupational therapy/Speech and Swallow/Physiatry eval  - Fall and Aspiration precautions  - Provide stroke education    # 7mm aneurysm   - LISA consult   - NPO after midnight for DSA    # HTN  # CAD  # HLD  - Goal -160   - obtain TTE with bubble  - c/w home metoprolol and losartan  - high dose statin     # DM  - f/u A1c  - Started sliding scale. Monitor FS and adjust insulin as needed.     # GERD  - c/w Home Pepcid    #Misc:  - DVT Prophylaxis: SCD, Lovenox  - Diet: NPO until s/s eval  - GI Prophylaxis: NI  - Activity: IAT  - Code status: Full code  - Dispo: Stroke Unit 70F, French-speaking, PMHx of HTN, HLD, CAD (supposed to be on ASA 81mg once daily), DM, GERD, chronic back issues with back surgery in [place], [place] in [place] 5 years ago, p/w left-sided numbness and tingling for 2 days. CTH negative for acute stroke, CTA with 7mm fusiform aneurysm of the Right V4. Admitted to stroke unit for further workup.    Plan:    # Possible R sided lacunar infarct vs myelopathy  - Home antiplatelets: aspirin 81mg qD  - Tele monitoring  - Neurochecks and vital signs q8h  - Systolic BP goal: 120-160  - Finger stick goal of 120-180  - Aspirin 81mg and Plavix 75mg daily  - Atorvastatin 80mg daily  - Obtain MRI Brain Non-con  - Obtain MRI C-spine w/wo --> Patient refused contrast  - Obtain TTE  - F/u HbA1c, TSH and Lipid panel  - Physical therapy/Occupational therapy/Speech and Swallow/Physiatry eval  - Fall and Aspiration precautions  - Provide stroke education    # 7mm aneurysm   - LISA consult   - NPO after midnight for DSA    # HTN  # CAD  # HLD  - Goal -160   - obtain TTE with bubble  - c/w home metoprolol and losartan  - high dose statin     # DM  - f/u A1c  - Started sliding scale. Monitor FS and adjust insulin as needed.     # GERD  - c/w Home Pepcid    #Misc:  - DVT Prophylaxis: SCD, Lovenox  - Diet: NPO until s/s eval  - GI Prophylaxis: NI  - Activity: IAT  - Code status: Full code  - Dispo: Stroke Unit

## 2025-06-27 NOTE — OCCUPATIONAL THERAPY INITIAL EVALUATION ADULT - NSOTDISCHREC_GEN_A_CORE
Pt requires supervision with ADLs and functional tranfsers. will benefit from home, likely no OT needs possibly home OT pending progress

## 2025-06-27 NOTE — PHYSICAL THERAPY INITIAL EVALUATION ADULT - GAIT TRAINING, PT EVAL
Increase ambulation using appropriate Assistive Device or without an Assistive Device , X 300 feet, no assistive device  by d/c.

## 2025-06-27 NOTE — PHYSICAL THERAPY INITIAL EVALUATION ADULT - GENERAL OBSERVATIONS, REHAB EVAL
[id number] pm Chart reviewed. Pt. seen semirecline in bed, in No apparent distress , + telemetry , IV lock , denies pain,. Pt. alert and oriented X 4.  Sami  used to communicate with pt ( Luz [id number]). Pt. agreed to activity/therapy.

## 2025-06-27 NOTE — PHYSICAL THERAPY INITIAL EVALUATION ADULT - SENSORY TESTS
Pt reported: intact sensation as light touch, deep pressure; however, endorsed 10-20% sensory deficit on left UE and B/L lower extremities (pinprick/pain)

## 2025-06-27 NOTE — CHART NOTE - NSCHARTNOTEFT_GEN_A_CORE
PACU ANESTHESIA ADMISSION NOTE      Procedure:   Post op diagnosis:      ____  Intubated  TV:______       Rate: ______      FiO2: ______    __x__  Patent Airway    __x__  Full return of protective reflexes    __x__  Full recovery from anesthesia / back to baseline status    Vitals:  T(C): 36.2 (06-27-25 @ 15:00), Max: 36.8 (06-26-25 @ 20:41)  HR: 77 (06-27-25 @ 15:00) (64 - 77)  BP: 144/74 (06-27-25 @ 15:00) (120/70 - 171/84)  RR: 18 (06-27-25 @ 15:00) (18 - 20)  SpO2: 99% (06-27-25 @ 15:00) (97% - 99%)    Mental Status:  __x__ Awake   ___x__ Alert   _____ Drowsy   _____ Sedated    Nausea/Vomiting:  __x__ NO  ______Yes,   See Post - Op Orders          Pain Scale (0-10):  _____    Treatment: ____ None    __x__ See Post - Op/PCA Orders    Post - Operative Fluids:   ____ Oral   __x__ See Post - Op Orders    Plan: Discharge:   ____Home       _____Floor     _____Critical Care    _____  Other:_________________    Comments: Patient had smooth intraoperative event, no anesthesia complication.  PACU Vital signs: HR:   60         BP:     90   / 52         RR: 12            O2 Sat:     100  %     Temp 97.1F

## 2025-06-27 NOTE — PHYSICAL THERAPY INITIAL EVALUATION ADULT - PRECAUTIONS/LIMITATIONS, REHAB EVAL
cardiac precautions/fall precautions permissible [phone] within 24 hrs;   120-180 SBP thereafter./cardiac precautions/fall precautions

## 2025-06-28 LAB
ALBUMIN SERPL ELPH-MCNC: 3.8 G/DL — SIGNIFICANT CHANGE UP (ref 3.5–5.2)
ALP SERPL-CCNC: 89 U/L — SIGNIFICANT CHANGE UP (ref 30–115)
ALT FLD-CCNC: 8 U/L — SIGNIFICANT CHANGE UP (ref 0–41)
ANION GAP SERPL CALC-SCNC: 13 MMOL/L — SIGNIFICANT CHANGE UP (ref 7–14)
ANION GAP SERPL CALC-SCNC: 14 MMOL/L — SIGNIFICANT CHANGE UP (ref 7–14)
AST SERPL-CCNC: 10 U/L — SIGNIFICANT CHANGE UP (ref 0–41)
BASOPHILS # BLD AUTO: 0.02 K/UL — SIGNIFICANT CHANGE UP (ref 0–0.2)
BASOPHILS # BLD AUTO: 0.03 K/UL — SIGNIFICANT CHANGE UP (ref 0–0.2)
BASOPHILS NFR BLD AUTO: 0.2 % — SIGNIFICANT CHANGE UP (ref 0–1)
BASOPHILS NFR BLD AUTO: 0.3 % — SIGNIFICANT CHANGE UP (ref 0–1)
BILIRUB SERPL-MCNC: 0.5 MG/DL — SIGNIFICANT CHANGE UP (ref 0.2–1.2)
BUN SERPL-MCNC: 26 MG/DL — HIGH (ref 10–20)
BUN SERPL-MCNC: 26 MG/DL — HIGH (ref 10–20)
CALCIUM SERPL-MCNC: 8.9 MG/DL — SIGNIFICANT CHANGE UP (ref 8.4–10.5)
CALCIUM SERPL-MCNC: 8.9 MG/DL — SIGNIFICANT CHANGE UP (ref 8.4–10.5)
CHLORIDE SERPL-SCNC: 100 MMOL/L — SIGNIFICANT CHANGE UP (ref 98–110)
CHLORIDE SERPL-SCNC: 98 MMOL/L — SIGNIFICANT CHANGE UP (ref 98–110)
CO2 SERPL-SCNC: 20 MMOL/L — SIGNIFICANT CHANGE UP (ref 17–32)
CO2 SERPL-SCNC: 23 MMOL/L — SIGNIFICANT CHANGE UP (ref 17–32)
CREAT SERPL-MCNC: 0.9 MG/DL — SIGNIFICANT CHANGE UP (ref 0.7–1.5)
CREAT SERPL-MCNC: 1 MG/DL — SIGNIFICANT CHANGE UP (ref 0.7–1.5)
EGFR: 60 ML/MIN/1.73M2 — SIGNIFICANT CHANGE UP
EGFR: 60 ML/MIN/1.73M2 — SIGNIFICANT CHANGE UP
EGFR: 68 ML/MIN/1.73M2 — SIGNIFICANT CHANGE UP
EGFR: 68 ML/MIN/1.73M2 — SIGNIFICANT CHANGE UP
EOSINOPHIL # BLD AUTO: 0 K/UL — SIGNIFICANT CHANGE UP (ref 0–0.7)
EOSINOPHIL # BLD AUTO: 0.01 K/UL — SIGNIFICANT CHANGE UP (ref 0–0.7)
EOSINOPHIL NFR BLD AUTO: 0 % — SIGNIFICANT CHANGE UP (ref 0–8)
EOSINOPHIL NFR BLD AUTO: 0.1 % — SIGNIFICANT CHANGE UP (ref 0–8)
GLUCOSE BLDC GLUCOMTR-MCNC: 133 MG/DL — HIGH (ref 70–99)
GLUCOSE BLDC GLUCOMTR-MCNC: 225 MG/DL — HIGH (ref 70–99)
GLUCOSE BLDC GLUCOMTR-MCNC: 293 MG/DL — HIGH (ref 70–99)
GLUCOSE BLDC GLUCOMTR-MCNC: 440 MG/DL — HIGH (ref 70–99)
GLUCOSE SERPL-MCNC: 276 MG/DL — HIGH (ref 70–99)
GLUCOSE SERPL-MCNC: 288 MG/DL — HIGH (ref 70–99)
HCT VFR BLD CALC: 35.7 % — LOW (ref 37–47)
HCT VFR BLD CALC: 36.1 % — LOW (ref 37–47)
HGB BLD-MCNC: 11.8 G/DL — LOW (ref 12–16)
HGB BLD-MCNC: 12 G/DL — SIGNIFICANT CHANGE UP (ref 12–16)
IMM GRANULOCYTES NFR BLD AUTO: 0.6 % — HIGH (ref 0.1–0.3)
IMM GRANULOCYTES NFR BLD AUTO: 0.6 % — HIGH (ref 0.1–0.3)
LYMPHOCYTES # BLD AUTO: 1.29 K/UL — SIGNIFICANT CHANGE UP (ref 1.2–3.4)
LYMPHOCYTES # BLD AUTO: 1.78 K/UL — SIGNIFICANT CHANGE UP (ref 1.2–3.4)
LYMPHOCYTES # BLD AUTO: 12.4 % — LOW (ref 20.5–51.1)
LYMPHOCYTES # BLD AUTO: 21 % — SIGNIFICANT CHANGE UP (ref 20.5–51.1)
MAGNESIUM SERPL-MCNC: 1.8 MG/DL — SIGNIFICANT CHANGE UP (ref 1.8–2.4)
MCHC RBC-ENTMCNC: 30.1 PG — SIGNIFICANT CHANGE UP (ref 27–31)
MCHC RBC-ENTMCNC: 30.5 PG — SIGNIFICANT CHANGE UP (ref 27–31)
MCHC RBC-ENTMCNC: 32.7 G/DL — SIGNIFICANT CHANGE UP (ref 32–37)
MCHC RBC-ENTMCNC: 33.6 G/DL — SIGNIFICANT CHANGE UP (ref 32–37)
MCV RBC AUTO: 90.8 FL — SIGNIFICANT CHANGE UP (ref 81–99)
MCV RBC AUTO: 92.1 FL — SIGNIFICANT CHANGE UP (ref 81–99)
MONOCYTES # BLD AUTO: 0.75 K/UL — HIGH (ref 0.1–0.6)
MONOCYTES # BLD AUTO: 0.92 K/UL — HIGH (ref 0.1–0.6)
MONOCYTES NFR BLD AUTO: 10.8 % — HIGH (ref 1.7–9.3)
MONOCYTES NFR BLD AUTO: 7.2 % — SIGNIFICANT CHANGE UP (ref 1.7–9.3)
NEUTROPHILS # BLD AUTO: 5.7 K/UL — SIGNIFICANT CHANGE UP (ref 1.4–6.5)
NEUTROPHILS # BLD AUTO: 8.27 K/UL — HIGH (ref 1.4–6.5)
NEUTROPHILS NFR BLD AUTO: 67.3 % — SIGNIFICANT CHANGE UP (ref 42.2–75.2)
NEUTROPHILS NFR BLD AUTO: 79.5 % — HIGH (ref 42.2–75.2)
NRBC BLD AUTO-RTO: 0 /100 WBCS — SIGNIFICANT CHANGE UP (ref 0–0)
NRBC BLD AUTO-RTO: 0 /100 WBCS — SIGNIFICANT CHANGE UP (ref 0–0)
PLATELET # BLD AUTO: 249 K/UL — SIGNIFICANT CHANGE UP (ref 130–400)
PLATELET # BLD AUTO: 259 K/UL — SIGNIFICANT CHANGE UP (ref 130–400)
PMV BLD: 11 FL — HIGH (ref 7.4–10.4)
PMV BLD: 11.3 FL — HIGH (ref 7.4–10.4)
POTASSIUM SERPL-MCNC: 4.8 MMOL/L — SIGNIFICANT CHANGE UP (ref 3.5–5)
POTASSIUM SERPL-MCNC: 4.9 MMOL/L — SIGNIFICANT CHANGE UP (ref 3.5–5)
POTASSIUM SERPL-SCNC: 4.8 MMOL/L — SIGNIFICANT CHANGE UP (ref 3.5–5)
POTASSIUM SERPL-SCNC: 4.9 MMOL/L — SIGNIFICANT CHANGE UP (ref 3.5–5)
PROT SERPL-MCNC: 6.2 G/DL — SIGNIFICANT CHANGE UP (ref 6–8)
RBC # BLD: 3.92 M/UL — LOW (ref 4.2–5.4)
RBC # BLD: 3.93 M/UL — LOW (ref 4.2–5.4)
RBC # FLD: 13.3 % — SIGNIFICANT CHANGE UP (ref 11.5–14.5)
RBC # FLD: 13.8 % — SIGNIFICANT CHANGE UP (ref 11.5–14.5)
SODIUM SERPL-SCNC: 134 MMOL/L — LOW (ref 135–146)
SODIUM SERPL-SCNC: 134 MMOL/L — LOW (ref 135–146)
WBC # BLD: 10.4 K/UL — SIGNIFICANT CHANGE UP (ref 4.8–10.8)
WBC # BLD: 8.48 K/UL — SIGNIFICANT CHANGE UP (ref 4.8–10.8)
WBC # FLD AUTO: 10.4 K/UL — SIGNIFICANT CHANGE UP (ref 4.8–10.8)
WBC # FLD AUTO: 8.48 K/UL — SIGNIFICANT CHANGE UP (ref 4.8–10.8)

## 2025-06-28 PROCEDURE — 99232 SBSQ HOSP IP/OBS MODERATE 35: CPT | Mod: GC

## 2025-06-28 PROCEDURE — 99232 SBSQ HOSP IP/OBS MODERATE 35: CPT

## 2025-06-28 RX ORDER — INSULIN GLARGINE-YFGN 100 [IU]/ML
6 INJECTION, SOLUTION SUBCUTANEOUS AT BEDTIME
Refills: 0 | Status: DISCONTINUED | OUTPATIENT
Start: 2025-06-28 | End: 2025-06-29

## 2025-06-28 RX ORDER — INSULIN LISPRO 100 U/ML
2 INJECTION, SOLUTION INTRAVENOUS; SUBCUTANEOUS
Refills: 0 | Status: DISCONTINUED | OUTPATIENT
Start: 2025-06-28 | End: 2025-06-29

## 2025-06-28 RX ORDER — MAGNESIUM SULFATE 500 MG/ML
1 SYRINGE (ML) INJECTION ONCE
Refills: 0 | Status: COMPLETED | OUTPATIENT
Start: 2025-06-28 | End: 2025-06-28

## 2025-06-28 RX ADMIN — Medication 5 MILLIGRAM(S): at 21:13

## 2025-06-28 RX ADMIN — Medication 650 MILLIGRAM(S): at 15:22

## 2025-06-28 RX ADMIN — INSULIN LISPRO 6: 100 INJECTION, SOLUTION INTRAVENOUS; SUBCUTANEOUS at 08:09

## 2025-06-28 RX ADMIN — Medication 81 MILLIGRAM(S): at 11:59

## 2025-06-28 RX ADMIN — INSULIN LISPRO 12: 100 INJECTION, SOLUTION INTRAVENOUS; SUBCUTANEOUS at 17:16

## 2025-06-28 RX ADMIN — Medication 100 GRAM(S): at 08:10

## 2025-06-28 RX ADMIN — CLOPIDOGREL BISULFATE 75 MILLIGRAM(S): 75 TABLET, FILM COATED ORAL at 12:00

## 2025-06-28 RX ADMIN — LOSARTAN POTASSIUM 50 MILLIGRAM(S): 100 TABLET, FILM COATED ORAL at 06:05

## 2025-06-28 RX ADMIN — Medication 20 MILLIGRAM(S): at 17:13

## 2025-06-28 RX ADMIN — INSULIN GLARGINE-YFGN 6 UNIT(S): 100 INJECTION, SOLUTION SUBCUTANEOUS at 21:13

## 2025-06-28 RX ADMIN — ENOXAPARIN SODIUM 40 MILLIGRAM(S): 100 INJECTION SUBCUTANEOUS at 08:07

## 2025-06-28 RX ADMIN — ATORVASTATIN CALCIUM 80 MILLIGRAM(S): 80 TABLET, FILM COATED ORAL at 21:13

## 2025-06-28 RX ADMIN — METOPROLOL SUCCINATE 12.5 MILLIGRAM(S): 50 TABLET, EXTENDED RELEASE ORAL at 17:12

## 2025-06-28 RX ADMIN — Medication 20 MILLIGRAM(S): at 06:05

## 2025-06-28 RX ADMIN — METOPROLOL SUCCINATE 12.5 MILLIGRAM(S): 50 TABLET, EXTENDED RELEASE ORAL at 06:05

## 2025-06-28 NOTE — PROGRESS NOTE ADULT - ASSESSMENT
70F, Croatian-speaking, PMHx of HTN, HLD, CAD (supposed to be on ASA 81mg once daily), DM, GERD, chronic back issues with back surgery in [place], [place] in [place] 5 years ago, p/w left-sided numbness and tingling for 2 days. CTH negative for acute stroke, CTA with 7mm fusiform aneurysm of the Right V4. Admitted to stroke unit for further workup.    Plan:    # Possible R sided lacunar infarct vs myelopathy  - Home antiplatelets: aspirin 81mg qD  - Tele monitoring  - Neurochecks and vital signs q8h  - Systolic BP goal: 120-160  - Finger stick goal of 120-180  - Aspirin 81mg and Plavix 75mg daily  - Atorvastatin 80mg daily  - Obtain MRI Brain Non-con  - Obtain MRI C-spine w/wo --> Patient refused contrast  - Obtain TTE  - F/u HbA1c, TSH and Lipid panel  - Physical therapy/Occupational therapy/Speech and Swallow/Physiatry eval  - Fall and Aspiration precautions  - Provide stroke education    # 7mm aneurysm   - LISA consult   - NPO after midnight for DSA    # HTN  # CAD  # HLD  - Goal -160   - obtain TTE with bubble  - c/w home metoprolol and losartan  - high dose statin     # DM  - f/u A1c  - Started sliding scale. Monitor FS and adjust insulin as needed.     # GERD  - c/w Home Pepcid    #Misc:  - DVT Prophylaxis: SCD, Lovenox  - Diet: NPO until s/s eval  - GI Prophylaxis: NI  - Activity: IAT  - Code status: Full code  - Dispo: Stroke Unit 70F, Indonesian-speaking, PMHx of HTN, HLD, CAD (supposed to be on ASA 81mg once daily), DM, GERD, chronic back issues with back surgery in [place], [place] in [place] 5 years ago, p/w left-sided numbness and tingling for 2 days. CTH negative for acute stroke, CTA with 7mm fusiform aneurysm of the Right V4. Admitted to stroke unit for further workup.    Plan:    # Possible R sided lacunar infarct vs myelopathy  - Home antiplatelets: aspirin 81mg qD  - Tele monitoring  - Neurochecks and vital signs q8h  - Systolic BP goal: 120-160  - Finger stick goal of 120-180  - c/w Aspirin 81mg and Plavix 75mg daily  - c/w Atorvastatin 80mg daily  - Obtain MRI Brain Non-con  - Obtain MRI C-spine w/wo --> Patient refused contrast  - Physical therapy/Occupational therapy/Speech and Swallow/Physiatry eval  - Fall and Aspiration precautions  - Provide stroke education    # 7mm aneurysm   - LISA consult   - DSA completed on 6/27 --> No acute intervention; pt to follow with Dr. Hoang outpatient.    # HTN  # CAD  # HLD  - Goal -160   - c/w home metoprolol and losartan  - c/w high dose statin     # DM  - f/u A1c  - Started sliding scale. Monitor FS and adjust insulin as needed.     # GERD  - c/w Home Pepcid    #Misc:  - DVT Prophylaxis: SCD, Lovenox  - Diet: NPO until s/s eval  - GI Prophylaxis: NI  - Activity: IAT  - Code status: Full code  - Dispo: Stroke Unit 70F, Spanish-speaking, PMHx of HTN, HLD, CAD (supposed to be on ASA 81mg once daily), DM, GERD, chronic back issues with back surgery in [place], [place] in [place] 5 years ago, p/w left-sided numbness and tingling for 2 days. CTH negative for acute stroke, CTA with 7mm fusiform aneurysm of the Right V4. Admitted to stroke unit for further workup.    Plan:    # Possible R sided lacunar infarct vs myelopathy  - Home antiplatelets: aspirin 81mg qD  - Tele monitoring  - Downgrade to 3E floor w/ q8 vitals and neuro checks  - Neuro checks and vital signs q8h  - Systolic BP goal: 120-160  - Finger stick goal of 120-180  - c/w Aspirin 81mg and Plavix 75mg daily  - c/w Atorvastatin 80mg daily  - Obtain MRI Brain Non-con  - Obtain MRI C-spine w/wo --> Patient refused contrast  - Physical therapy/Occupational therapy/Speech and Swallow/Physiatry eval  - Fall and Aspiration precautions  - Provide stroke education    # 7mm aneurysm   - LISA consult   - DSA completed on 6/27 --> No acute intervention; pt to follow with Dr. Hoang outpatient.    # HTN  # CAD  # HLD  - Goal -160   - c/w home metoprolol and losartan  - c/w high dose statin     # DM  - f/u A1c  - Started Lantus 6U q24 at bedtime.  - Started sliding scale. Monitor FS and adjust insulin as needed.     # GERD  - c/w Home Pepcid    #Misc:  - DVT Prophylaxis: SCD, Lovenox  - Diet: NPO until s/s eval  - GI Prophylaxis: NI  - Activity: IAT  - Code status: Full code  - Dispo: Stroke Unit --> 3E floor

## 2025-06-28 NOTE — PROGRESS NOTE ADULT - SUBJECTIVE AND OBJECTIVE BOX
Neurology Progress Note    Interval History:          PAST MEDICAL & SURGICAL HISTORY:  HTN (hypertension)    HLD (hyperlipidemia)    Myocardial infarct    Chronic GERD    Status post lumbar surgery            Medications:  acetaminophen     Tablet .. 650 milliGRAM(s) Oral every 6 hours PRN  aspirin enteric coated 81 milliGRAM(s) Oral daily  atorvastatin 80 milliGRAM(s) Oral at bedtime  clopidogrel Tablet 75 milliGRAM(s) Oral daily  dextrose 5%. 1000 milliLiter(s) IV Continuous <Continuous>  dextrose 5%. 1000 milliLiter(s) IV Continuous <Continuous>  dextrose 50% Injectable 25 Gram(s) IV Push once  dextrose 50% Injectable 12.5 Gram(s) IV Push once  dextrose 50% Injectable 25 Gram(s) IV Push once  dextrose Oral Gel 15 Gram(s) Oral once PRN  enoxaparin Injectable 40 milliGRAM(s) SubCutaneous every 24 hours  famotidine    Tablet 20 milliGRAM(s) Oral two times a day  glucagon  Injectable 1 milliGRAM(s) IntraMuscular once  insulin lispro (ADMELOG) corrective regimen sliding scale   SubCutaneous three times a day before meals  losartan 50 milliGRAM(s) Oral daily  melatonin 5 milliGRAM(s) Oral at bedtime  metoprolol tartrate 12.5 milliGRAM(s) Oral two times a day      Vital Signs Last 24 Hrs  T(C): 36.6 (28 Jun 2025 04:00), Max: 36.9 (27 Jun 2025 20:45)  T(F): 97.9 (28 Jun 2025 04:00), Max: 98.4 (27 Jun 2025 20:45)  HR: 62 (28 Jun 2025 06:00) (54 - 80)  BP: 120/78 (28 Jun 2025 06:00) (90/52 - 162/86)  BP(mean): 100 (28 Jun 2025 06:00) (81 - 139)  RR: 18 (28 Jun 2025 04:00) (15 - 20)  SpO2: 98% (28 Jun 2025 04:00) (96% - 99%)    Parameters below as of 28 Jun 2025 04:00  Patient On (Oxygen Delivery Method): room air        Neurological Exam:   Mental status: Awake, alert and oriented x3.  Recent and remote memory intact.  Naming, repetition and comprehension intact.  Attention/concentration intact.  No dysarthria, no aphasia.  Fund of knowledge appropriate.    Cranial nerves: Pupils equally round and reactive to light, visual fields full, no nystagmus, extraocular muscles intact, V1 through V3 intact bilaterally and symmetric, face symmetric, hearing intact to finger rub, palate elevation symmetric, tongue was midline.  Motor:  MRC grading 5/5 b/l UE/LE.   strength 5/5.  Normal tone and bulk.  No abnormal movements.    Sensation: Intact to light touch, proprioception, and pinprick.   Coordination: No dysmetria on finger-to-nose and heel-to-shin.  No dysdiadokinesia.  Reflexes: 2+ in bilateral UE/LE, downgoing toes bilaterally. (-) Dhaliwal.  Gait: Narrow and steady. No ataxia.  Romberg negative    Labs:  CBC Full  -  ( 28 Jun 2025 05:33 )  WBC Count : 8.48 K/uL  RBC Count : 3.92 M/uL  Hemoglobin : 11.8 g/dL  Hematocrit : 36.1 %  Platelet Count - Automated : 249 K/uL  Mean Cell Volume : 92.1 fL  Mean Cell Hemoglobin : 30.1 pg  Mean Cell Hemoglobin Concentration : 32.7 g/dL  Auto Neutrophil # : x  Auto Lymphocyte # : x  Auto Monocyte # : x  Auto Eosinophil # : x  Auto Basophil # : x  Auto Neutrophil % : x  Auto Lymphocyte % : x  Auto Monocyte % : x  Auto Eosinophil % : x  Auto Basophil % : x    06-28    134[L]  |  98  |  26[H]  ----------------------------<  288[H]  4.9   |  23  |  1.0    Ca    8.9      28 Jun 2025 05:33  Mg     1.8     06-28    TPro  6.2  /  Alb  3.8  /  TBili  0.5  /  DBili  x   /  AST  10  /  ALT  8   /  AlkPhos  89  06-28    LIVER FUNCTIONS - ( 28 Jun 2025 05:33 )  Alb: 3.8 g/dL / Pro: 6.2 g/dL / ALK PHOS: 89 U/L / ALT: 8 U/L / AST: 10 U/L / GGT: x           PT/INR - ( 27 Jun 2025 11:56 )   PT: 9.50 sec;   INR: 0.81 ratio         PTT - ( 27 Jun 2025 11:56 )  PTT:33.2 sec  Urinalysis Basic - ( 28 Jun 2025 05:33 )    Color: x / Appearance: x / SG: x / pH: x  Gluc: 288 mg/dL / Ketone: x  / Bili: x / Urobili: x   Blood: x / Protein: x / Nitrite: x   Leuk Esterase: x / RBC: x / WBC x   Sq Epi: x / Non Sq Epi: x / Bacteria: x        Assessment:  This is a 70y Female w/ h/o     Plan: Neurology Progress Note    Interval History:    The patient was encountered lying in bed, lethargic, but arousable and able to follow commands, and in no acute distress. She denied any new or acute complaints overnight, except for a mild (2/10) HA w/ nausea that resolved with Tylenol. The patient is now s/p DSA on 6/27.      PAST MEDICAL & SURGICAL HISTORY:  HTN (hypertension)    HLD (hyperlipidemia)    Myocardial infarct    Chronic GERD    Status post lumbar surgery            Medications:  acetaminophen     Tablet .. 650 milliGRAM(s) Oral every 6 hours PRN  aspirin enteric coated 81 milliGRAM(s) Oral daily  atorvastatin 80 milliGRAM(s) Oral at bedtime  clopidogrel Tablet 75 milliGRAM(s) Oral daily  dextrose 5%. 1000 milliLiter(s) IV Continuous <Continuous>  dextrose 5%. 1000 milliLiter(s) IV Continuous <Continuous>  dextrose 50% Injectable 25 Gram(s) IV Push once  dextrose 50% Injectable 12.5 Gram(s) IV Push once  dextrose 50% Injectable 25 Gram(s) IV Push once  dextrose Oral Gel 15 Gram(s) Oral once PRN  enoxaparin Injectable 40 milliGRAM(s) SubCutaneous every 24 hours  famotidine    Tablet 20 milliGRAM(s) Oral two times a day  glucagon  Injectable 1 milliGRAM(s) IntraMuscular once  insulin lispro (ADMELOG) corrective regimen sliding scale   SubCutaneous three times a day before meals  losartan 50 milliGRAM(s) Oral daily  melatonin 5 milliGRAM(s) Oral at bedtime  metoprolol tartrate 12.5 milliGRAM(s) Oral two times a day      Vital Signs Last 24 Hrs  T(C): 36.6 (28 Jun 2025 04:00), Max: 36.9 (27 Jun 2025 20:45)  T(F): 97.9 (28 Jun 2025 04:00), Max: 98.4 (27 Jun 2025 20:45)  HR: 62 (28 Jun 2025 06:00) (54 - 80)  BP: 120/78 (28 Jun 2025 06:00) (90/52 - 162/86)  BP(mean): 100 (28 Jun 2025 06:00) (81 - 139)  RR: 18 (28 Jun 2025 04:00) (15 - 20)  SpO2: 98% (28 Jun 2025 04:00) (96% - 99%)    Parameters below as of 28 Jun 2025 04:00  Patient On (Oxygen Delivery Method): room air        Neurological Exam:   -Mental status: Awake, alert, oriented to person, place, month, and year. Speech is fluent with intact naming, repetition, and comprehension, no dysarthria. Recent and remote memory intact. Follows commands. Attention/concentration intact.   -Cranial nerves:   II: Visual fields are full to confrontation. BTT intact.   III, IV, VI: Extraocular movements are intact without nystagmus. Pupils equally round and reactive to light  V:  Facial sensation V1-V3 equal and intact   VII: Face is symmetric with normal eye closure and smile  XII: Tongue protrudes midline  Motor: Normal bulk and tone. No pronator drift. Strength bilateral upper extremity 5/5, bilateral lower extremities 5/5.  Sensation: decreased LUE/LLE sensation to pin prick and light touch, temperature intact.   Coordination: No dysmetria on finger-to-nose and heel-to-shin bilaterally  Reflexes: hyporeflexic in b/l LEs   Gait: Deferred at this encounter.  NIHSS: 1 (LUE & LLE sensory)    Labs:  CBC Full  -  ( 28 Jun 2025 05:33 )  WBC Count : 8.48 K/uL  RBC Count : 3.92 M/uL  Hemoglobin : 11.8 g/dL  Hematocrit : 36.1 %  Platelet Count - Automated : 249 K/uL  Mean Cell Volume : 92.1 fL  Mean Cell Hemoglobin : 30.1 pg  Mean Cell Hemoglobin Concentration : 32.7 g/dL  Auto Neutrophil # : x  Auto Lymphocyte # : x  Auto Monocyte # : x  Auto Eosinophil # : x  Auto Basophil # : x  Auto Neutrophil % : x  Auto Lymphocyte % : x  Auto Monocyte % : x  Auto Eosinophil % : x  Auto Basophil % : x    06-28    134[L]  |  98  |  26[H]  ----------------------------<  288[H]  4.9   |  23  |  1.0    Ca    8.9      28 Jun 2025 05:33  Mg     1.8     06-28    TPro  6.2  /  Alb  3.8  /  TBili  0.5  /  DBili  x   /  AST  10  /  ALT  8   /  AlkPhos  89  06-28    LIVER FUNCTIONS - ( 28 Jun 2025 05:33 )  Alb: 3.8 g/dL / Pro: 6.2 g/dL / ALK PHOS: 89 U/L / ALT: 8 U/L / AST: 10 U/L / GGT: x           PT/INR - ( 27 Jun 2025 11:56 )   PT: 9.50 sec;   INR: 0.81 ratio         PTT - ( 27 Jun 2025 11:56 )  PTT:33.2 sec  Urinalysis Basic - ( 28 Jun 2025 05:33 )    Color: x / Appearance: x / SG: x / pH: x  Gluc: 288 mg/dL / Ketone: x  / Bili: x / Urobili: x   Blood: x / Protein: x / Nitrite: x   Leuk Esterase: x / RBC: x / WBC x   Sq Epi: x / Non Sq Epi: x / Bacteria: x

## 2025-06-28 NOTE — PROGRESS NOTE ADULT - SUBJECTIVE AND OBJECTIVE BOX
SUBJECTIVE:    Patient is a 70y old Female who presents with a chief complaint of Suspected stroke (28 Jun 2025 07:22)      HPI:  69 y/o F with a PMHx of HTN, HLD, CAD (supposed ot be on ASA 81mg once daily), DM, GERD, chronic back issues with back surgery in [place], [place] 5 years ago, presenting to the ED with subjective decreased sensation in the LUE/LLE. Pt states when she woke up 2 days ago, her LLE felt different to her and numb, and when she woke up yesterday, her LUE felt numb in addition to her LLE. She came to the ED with concerns for another MI as she states she felt L sided numbness in her arm and leg at that time too. Pt has not taken any of her medication in the past month because she is visiting [place] and cannot fill her prescription. /90. Denies accompanying speech issues, vision changes, facial involvement, extremity weakness, dizziness. Endorses that her gait has been abnormal for the past couple of years due to her back surgery, however does not feel her gait worsened. Denies known hx of stroke, TIA, seizure, neck pain.  (26 Jun 2025 19:36)      Currently admitted to medicine with the primary diagnosis of Left leg numbness     currently asymptomatic, tingling gone, no pain    Besides the pertinent positives and negatives described above, the ROS was within normal limits.    PAST MEDICAL & SURGICAL HISTORY  HTN (hypertension)    HLD (hyperlipidemia)    Myocardial infarct    Chronic GERD    Status post lumbar surgery      SOCIAL HISTORY:    ALLERGIES:  No Known Allergies    MEDICATIONS:  STANDING MEDICATIONS  aspirin enteric coated 81 milliGRAM(s) Oral daily  atorvastatin 80 milliGRAM(s) Oral at bedtime  clopidogrel Tablet 75 milliGRAM(s) Oral daily  dextrose 5%. 1000 milliLiter(s) IV Continuous <Continuous>  dextrose 5%. 1000 milliLiter(s) IV Continuous <Continuous>  dextrose 50% Injectable 25 Gram(s) IV Push once  dextrose 50% Injectable 12.5 Gram(s) IV Push once  dextrose 50% Injectable 25 Gram(s) IV Push once  enoxaparin Injectable 40 milliGRAM(s) SubCutaneous every 24 hours  famotidine    Tablet 20 milliGRAM(s) Oral two times a day  glucagon  Injectable 1 milliGRAM(s) IntraMuscular once  insulin glargine Injectable (LANTUS) 6 Unit(s) SubCutaneous at bedtime  insulin lispro (ADMELOG) corrective regimen sliding scale   SubCutaneous three times a day before meals  insulin lispro Injectable (ADMELOG) 2 Unit(s) SubCutaneous three times a day before meals  losartan 50 milliGRAM(s) Oral daily  melatonin 5 milliGRAM(s) Oral at bedtime  metoprolol tartrate 12.5 milliGRAM(s) Oral two times a day    PRN MEDICATIONS  acetaminophen     Tablet .. 650 milliGRAM(s) Oral every 6 hours PRN  dextrose Oral Gel 15 Gram(s) Oral once PRN    VITALS:   T(F): 98  HR: 66  BP: 121/63  RR: 20  SpO2: 98%    LABS:                        11.8   8.48  )-----------( 249      ( 28 Jun 2025 05:33 )             36.1     06-28    134[L]  |  98  |  26[H]  ----------------------------<  288[H]  4.9   |  23  |  1.0    Ca    8.9      28 Jun 2025 05:33  Mg     1.8     06-28    TPro  6.2  /  Alb  3.8  /  TBili  0.5  /  DBili  x   /  AST  10  /  ALT  8   /  AlkPhos  89  06-28    PT/INR - ( 27 Jun 2025 11:56 )   PT: 9.50 sec;   INR: 0.81 ratio         PTT - ( 27 Jun 2025 11:56 )  PTT:33.2 sec  Urinalysis Basic - ( 28 Jun 2025 05:33 )    Color: x / Appearance: x / SG: x / pH: x  Gluc: 288 mg/dL / Ketone: x  / Bili: x / Urobili: x   Blood: x / Protein: x / Nitrite: x   Leuk Esterase: x / RBC: x / WBC x   Sq Epi: x / Non Sq Epi: x / Bacteria: x            Urinalysis with Rflx Culture (collected 26 Jun 2025 14:04)          Left leg numbness      RADIOLOGY:    PHYSICAL EXAM:  General: WN/WD NAD  Neurology: A&Ox3, nonfocal, SAUNDERS x 4  Head:  Normocephalic, atraumatic  ENT:  Mucosa moist, no ulcerations  Neck:  Supple, no sinuses or palpable masses  Lymphatic:  No palpable cervical, supraclavicular, axillary or inguinal adenopathy  Respiratory: CTA B/L  CV: RRR, S1S2, no murmur  Abdominal: Soft, NT, ND no palpable mass  MSK: No edema, + peripheral pulses  Incisions: intact, no erythema or drainage    Intravenous access: yes  NG tube: no  Parkinson Catheter: no

## 2025-06-28 NOTE — PROGRESS NOTE ADULT - ASSESSMENT
71 y/o F with a PMHx of HTN, HLD, CAD (supposed ot be on ASA 81mg once daily), DM, GERD, chronic back issues with back surgery in [place], [place] 5 years ago, presenting to the ED with subjective decreased sensation in the LUE/LLE. CTA showing right vertebral 7mm aneurysm, angiogram done.    Assessment    ·	Left-sided tingling/numbness improved  ·	7 mm left vertebral aneurysm, s/p angiogram  ·	Per neuro possible right-sided lacunar infarct  ·	CAD  ·	HTN  ·	DM    Recommendations    - neuro recommending aspirin/plavix/atorvastatin, f/u PT/OT  - f/u neuro-IR  - c/w losartan / metoprolol  - glucose has been labile, insulin increased to 6 lantus and 2/2/2 with sliding scale, based on response can give insulin in increments of 2 as she takes that much at home and is labile (to avoid hypoglycemia), starting NS can help mobilize insulin if fs remaining high    # DVT PPX: on lovenox

## 2025-06-29 LAB
ALBUMIN SERPL ELPH-MCNC: 3.8 G/DL — SIGNIFICANT CHANGE UP (ref 3.5–5.2)
ALP SERPL-CCNC: 91 U/L — SIGNIFICANT CHANGE UP (ref 30–115)
ALT FLD-CCNC: 10 U/L — SIGNIFICANT CHANGE UP (ref 0–41)
ANION GAP SERPL CALC-SCNC: 13 MMOL/L — SIGNIFICANT CHANGE UP (ref 7–14)
AST SERPL-CCNC: 13 U/L — SIGNIFICANT CHANGE UP (ref 0–41)
BASOPHILS # BLD AUTO: 0.02 K/UL — SIGNIFICANT CHANGE UP (ref 0–0.2)
BASOPHILS NFR BLD AUTO: 0.3 % — SIGNIFICANT CHANGE UP (ref 0–1)
BILIRUB SERPL-MCNC: 0.2 MG/DL — SIGNIFICANT CHANGE UP (ref 0.2–1.2)
BUN SERPL-MCNC: 26 MG/DL — HIGH (ref 10–20)
CALCIUM SERPL-MCNC: 8.9 MG/DL — SIGNIFICANT CHANGE UP (ref 8.4–10.5)
CHLORIDE SERPL-SCNC: 100 MMOL/L — SIGNIFICANT CHANGE UP (ref 98–110)
CO2 SERPL-SCNC: 20 MMOL/L — SIGNIFICANT CHANGE UP (ref 17–32)
CREAT SERPL-MCNC: 1.1 MG/DL — SIGNIFICANT CHANGE UP (ref 0.7–1.5)
EGFR: 54 ML/MIN/1.73M2 — LOW
EGFR: 54 ML/MIN/1.73M2 — LOW
EOSINOPHIL # BLD AUTO: 0.01 K/UL — SIGNIFICANT CHANGE UP (ref 0–0.7)
EOSINOPHIL NFR BLD AUTO: 0.1 % — SIGNIFICANT CHANGE UP (ref 0–8)
GLUCOSE BLDC GLUCOMTR-MCNC: 175 MG/DL — HIGH (ref 70–99)
GLUCOSE BLDC GLUCOMTR-MCNC: 182 MG/DL — HIGH (ref 70–99)
GLUCOSE BLDC GLUCOMTR-MCNC: 204 MG/DL — HIGH (ref 70–99)
GLUCOSE BLDC GLUCOMTR-MCNC: 317 MG/DL — HIGH (ref 70–99)
GLUCOSE SERPL-MCNC: 330 MG/DL — HIGH (ref 70–99)
HCT VFR BLD CALC: 35.3 % — LOW (ref 37–47)
HGB BLD-MCNC: 11.5 G/DL — LOW (ref 12–16)
IMM GRANULOCYTES NFR BLD AUTO: 0.6 % — HIGH (ref 0.1–0.3)
LYMPHOCYTES # BLD AUTO: 1.52 K/UL — SIGNIFICANT CHANGE UP (ref 1.2–3.4)
LYMPHOCYTES # BLD AUTO: 22.4 % — SIGNIFICANT CHANGE UP (ref 20.5–51.1)
MAGNESIUM SERPL-MCNC: 2.1 MG/DL — SIGNIFICANT CHANGE UP (ref 1.8–2.4)
MCHC RBC-ENTMCNC: 29.8 PG — SIGNIFICANT CHANGE UP (ref 27–31)
MCHC RBC-ENTMCNC: 32.6 G/DL — SIGNIFICANT CHANGE UP (ref 32–37)
MCV RBC AUTO: 91.5 FL — SIGNIFICANT CHANGE UP (ref 81–99)
MONOCYTES # BLD AUTO: 0.89 K/UL — HIGH (ref 0.1–0.6)
MONOCYTES NFR BLD AUTO: 13.1 % — HIGH (ref 1.7–9.3)
NEUTROPHILS # BLD AUTO: 4.32 K/UL — SIGNIFICANT CHANGE UP (ref 1.4–6.5)
NEUTROPHILS NFR BLD AUTO: 63.5 % — SIGNIFICANT CHANGE UP (ref 42.2–75.2)
NRBC BLD AUTO-RTO: 0 /100 WBCS — SIGNIFICANT CHANGE UP (ref 0–0)
PHOSPHATE SERPL-MCNC: 4 MG/DL — SIGNIFICANT CHANGE UP (ref 2.1–4.9)
PLATELET # BLD AUTO: 246 K/UL — SIGNIFICANT CHANGE UP (ref 130–400)
PMV BLD: 11.3 FL — HIGH (ref 7.4–10.4)
POTASSIUM SERPL-MCNC: 4.9 MMOL/L — SIGNIFICANT CHANGE UP (ref 3.5–5)
POTASSIUM SERPL-SCNC: 4.9 MMOL/L — SIGNIFICANT CHANGE UP (ref 3.5–5)
PROT SERPL-MCNC: 6.5 G/DL — SIGNIFICANT CHANGE UP (ref 6–8)
RBC # BLD: 3.86 M/UL — LOW (ref 4.2–5.4)
RBC # FLD: 13.5 % — SIGNIFICANT CHANGE UP (ref 11.5–14.5)
SODIUM SERPL-SCNC: 133 MMOL/L — LOW (ref 135–146)
WBC # BLD: 6.8 K/UL — SIGNIFICANT CHANGE UP (ref 4.8–10.8)
WBC # FLD AUTO: 6.8 K/UL — SIGNIFICANT CHANGE UP (ref 4.8–10.8)

## 2025-06-29 PROCEDURE — 99232 SBSQ HOSP IP/OBS MODERATE 35: CPT

## 2025-06-29 PROCEDURE — 99232 SBSQ HOSP IP/OBS MODERATE 35: CPT | Mod: GC

## 2025-06-29 RX ORDER — INSULIN GLARGINE-YFGN 100 [IU]/ML
12 INJECTION, SOLUTION SUBCUTANEOUS AT BEDTIME
Refills: 0 | Status: DISCONTINUED | OUTPATIENT
Start: 2025-06-29 | End: 2025-06-30

## 2025-06-29 RX ORDER — INSULIN LISPRO 100 U/ML
3 INJECTION, SOLUTION INTRAVENOUS; SUBCUTANEOUS
Refills: 0 | Status: DISCONTINUED | OUTPATIENT
Start: 2025-06-29 | End: 2025-06-30

## 2025-06-29 RX ORDER — INSULIN LISPRO 100 U/ML
3 INJECTION, SOLUTION INTRAVENOUS; SUBCUTANEOUS ONCE
Refills: 0 | Status: COMPLETED | OUTPATIENT
Start: 2025-06-29 | End: 2025-06-29

## 2025-06-29 RX ADMIN — ENOXAPARIN SODIUM 40 MILLIGRAM(S): 100 INJECTION SUBCUTANEOUS at 08:22

## 2025-06-29 RX ADMIN — INSULIN LISPRO 2 UNIT(S): 100 INJECTION, SOLUTION INTRAVENOUS; SUBCUTANEOUS at 08:17

## 2025-06-29 RX ADMIN — Medication 5 MILLIGRAM(S): at 22:00

## 2025-06-29 RX ADMIN — INSULIN LISPRO 3 UNIT(S): 100 INJECTION, SOLUTION INTRAVENOUS; SUBCUTANEOUS at 17:17

## 2025-06-29 RX ADMIN — INSULIN LISPRO 2: 100 INJECTION, SOLUTION INTRAVENOUS; SUBCUTANEOUS at 12:21

## 2025-06-29 RX ADMIN — INSULIN LISPRO 3 UNIT(S): 100 INJECTION, SOLUTION INTRAVENOUS; SUBCUTANEOUS at 12:21

## 2025-06-29 RX ADMIN — LOSARTAN POTASSIUM 50 MILLIGRAM(S): 100 TABLET, FILM COATED ORAL at 05:02

## 2025-06-29 RX ADMIN — ATORVASTATIN CALCIUM 80 MILLIGRAM(S): 80 TABLET, FILM COATED ORAL at 22:00

## 2025-06-29 RX ADMIN — Medication 81 MILLIGRAM(S): at 12:21

## 2025-06-29 RX ADMIN — METOPROLOL SUCCINATE 12.5 MILLIGRAM(S): 50 TABLET, EXTENDED RELEASE ORAL at 17:18

## 2025-06-29 RX ADMIN — INSULIN LISPRO 8: 100 INJECTION, SOLUTION INTRAVENOUS; SUBCUTANEOUS at 08:18

## 2025-06-29 RX ADMIN — Medication 20 MILLIGRAM(S): at 05:02

## 2025-06-29 RX ADMIN — INSULIN GLARGINE-YFGN 12 UNIT(S): 100 INJECTION, SOLUTION SUBCUTANEOUS at 22:01

## 2025-06-29 RX ADMIN — Medication 650 MILLIGRAM(S): at 13:59

## 2025-06-29 RX ADMIN — METOPROLOL SUCCINATE 12.5 MILLIGRAM(S): 50 TABLET, EXTENDED RELEASE ORAL at 05:01

## 2025-06-29 RX ADMIN — Medication 20 MILLIGRAM(S): at 17:18

## 2025-06-29 RX ADMIN — INSULIN LISPRO 4: 100 INJECTION, SOLUTION INTRAVENOUS; SUBCUTANEOUS at 17:17

## 2025-06-29 NOTE — PROGRESS NOTE ADULT - NS ATTEST RISK PROBLEM GEN_ALL_CORE FT
stroke workup negative  MR c-spine noted for cervical stenosis- outpatient PT  DM- increase lantus and premeal ademalog for FSBS control  unruptured cerebral aneurysm- f/u with neuroIR; sbp control  hld- c/w statin  htn- losartan; vitals q 8.
stroke workup negative  MR c-spine noted for cervical stenosis- outpatient PT  DM- add lantus for FSBS control  unruptured cerebral aneurysm- f/u with neuroIR; sbp control  hld- c/w statin  htn- losartan; vitals q 8

## 2025-06-29 NOTE — DIETITIAN INITIAL EVALUATION ADULT - PERTINENT LABORATORY DATA
06-29    133[L]  |  100  |  26[H]  ----------------------------<  330[H]  4.9   |  20  |  1.1    Ca    8.9      29 Jun 2025 05:35  Phos  4.0     06-29  Mg     2.1     06-29    TPro  6.5  /  Alb  3.8  /  TBili  0.2  /  DBili  x   /  AST  13  /  ALT  10  /  AlkPhos  91  06-29  POCT Blood Glucose.: 204 mg/dL (06-29-25 @ 16:23)  A1C with Estimated Average Glucose Result: 9.6 % (06-27-25 @ 07:42)  A1C with Estimated Average Glucose Result: 10.0 % (06-26-25 @ 23:15)

## 2025-06-29 NOTE — PROGRESS NOTE ADULT - ASSESSMENT
70F, Sami-speaking, PMHx of HTN, HLD, CAD (supposed to be on ASA 81mg once daily), DM, GERD, chronic back issues with back surgery in [place], STEMI in [place] 5 years ago, p/w left-sided numbness and tingling for 2 days. CTH negative for acute stroke, CTA with 7mm fusiform aneurysm of the Right V4. Admitted to stroke unit for further workup.    Plan:    # Possible R sided lacunar infarct vs myelopathy  - Home antiplatelets: aspirin 81mg qD  - Tele monitoring  - Downgrade to 3E floor w/ q8 vitals and neuro checks  - Neuro checks and vital signs q8h  - Systolic BP goal: 120-160  - Finger stick goal of 120-180  - c/w Aspirin 81mg Stop plavix no stroke  - c/w Atorvastatin 80mg daily  - Physical therapy/Occupational therapy/Speech and Swallow/Physiatry eval  - Fall and Aspiration precautions  - Provide stroke education    # 7mm aneurysm   - LISA consult   - DSA completed on 6/27 --> No acute intervention; pt to follow with Dr. Hoang outpatient.    # HTN  # CAD  # HLD  - Goal -160   - c/w home metoprolol and losartan  - c/w high dose statin     # DM  - f/u A1c  - Increase Lantus 14u and Lispro 3U premeal (per surescripts home regiment)  - Started sliding scale. Monitor FS and adjust insulin as needed.     # GERD  - c/w Home Pepcid    #Misc:  - DVT Prophylaxis: SCD, Lovenox  - Diet: NPO until s/s eval  - GI Prophylaxis: NI  - Activity: IAT  - Code status: Full code  - Dispo: Stroke Unit --> 3E floor

## 2025-06-29 NOTE — PROGRESS NOTE ADULT - ASSESSMENT
71 y/o F with a PMHx of HTN, HLD, CAD (supposed ot be on ASA 81mg once daily), DM, GERD, chronic back issues with back surgery in [place], [place] 5 years ago, presenting to the ED with subjective decreased sensation in the LUE/LLE. CTA showing right vertebral 7mm aneurysm, angiogram done.    Assessment    ·	Left-sided tingling/numbness improved  ·	7 mm left vertebral aneurysm, s/p angiogram  ·	Per neuro possible right-sided lacunar infarct  ·	CAD  ·	HTN  ·	DM    Recommendations    - neuro recommending aspirin/plavix/atorvastatin, f/u PT/OT  - f/u neuro-IR  - c/w losartan / metoprolol  - glucose has been labile likely based on diet, on 6 lantus and 2/2/2 with sliding scale    # DVT PPX: on lovenox

## 2025-06-29 NOTE — DIETITIAN INITIAL EVALUATION ADULT - NAME AND PHONE
Goal: 1.Continue to consume/tolerate 75%-100% of meals in 7 days (Low Risk)   Intervention: 1.Meals and Snacks   Monitor/Evaluate: Diet order, energy intake, nutrition focused physical findings, Na, glucose, renal and anemia profile

## 2025-06-29 NOTE — DIETITIAN INITIAL EVALUATION ADULT - ORAL INTAKE PTA/DIET HISTORY
I spoke to the patient's daughter, who is present. Per daughter, prior to admission, the patient followed a diabetic diet; consumed three meals at >75% w/o chewing and swallowing difficulty. NKFA. The patient took a calcium, iron, Vitamin B12 and Vitamin D supplement daily. Denies weight loss.

## 2025-06-29 NOTE — DIETITIAN INITIAL EVALUATION ADULT - PERTINENT MEDS FT
MEDICATIONS  (STANDING):  aspirin enteric coated 81 milliGRAM(s) Oral daily  atorvastatin 80 milliGRAM(s) Oral at bedtime  dextrose 5%. 1000 milliLiter(s) (50 mL/Hr) IV Continuous <Continuous>  dextrose 5%. 1000 milliLiter(s) (100 mL/Hr) IV Continuous <Continuous>  dextrose 50% Injectable 25 Gram(s) IV Push once  dextrose 50% Injectable 12.5 Gram(s) IV Push once  dextrose 50% Injectable 25 Gram(s) IV Push once  enoxaparin Injectable 40 milliGRAM(s) SubCutaneous every 24 hours  famotidine    Tablet 20 milliGRAM(s) Oral two times a day  glucagon  Injectable 1 milliGRAM(s) IntraMuscular once  insulin glargine Injectable (LANTUS) 12 Unit(s) SubCutaneous at bedtime  insulin lispro (ADMELOG) corrective regimen sliding scale   SubCutaneous three times a day before meals  insulin lispro Injectable (ADMELOG) 3 Unit(s) SubCutaneous three times a day before meals  losartan 50 milliGRAM(s) Oral daily  melatonin 5 milliGRAM(s) Oral at bedtime  metoprolol tartrate 12.5 milliGRAM(s) Oral two times a day    MEDICATIONS  (PRN):  acetaminophen     Tablet .. 650 milliGRAM(s) Oral every 6 hours PRN Mild Pain (1 - 3), Moderate Pain (4 - 6)  dextrose Oral Gel 15 Gram(s) Oral once PRN Blood Glucose LESS THAN 70 milliGRAM(s)/deciliter

## 2025-06-29 NOTE — DIETITIAN INITIAL EVALUATION ADULT - NSFNSGIIOFT_GEN_A_CORE
Dx: 71y/o female with h/o HTN, HLD, CAD (supposed to be on ASA 81mg once daily), DM, GERD, chronic back issues with back surgery in [place] and STEM[place] 5 years ago, presented with left-sided numbness and tingling for 2 days. CT scan of the head is negative for acute stroke. CTA with 7mm fusiform aneurysm of the Right V4. Admitted to stroke unit for further workup. Possible right sided lacunar infarct vs myelopathy noted.

## 2025-06-29 NOTE — PROGRESS NOTE ADULT - SUBJECTIVE AND OBJECTIVE BOX
Neurology Progress Note    Interval History:  The patient was seen and examined at the bedside. Sitting up in bed, eating breakfast, no complaints.       PAST MEDICAL & SURGICAL HISTORY:  HTN (hypertension)    HLD (hyperlipidemia)    Myocardial infarct    Chronic GERD    Status post lumbar surgery            Medications:  acetaminophen     Tablet .. 650 milliGRAM(s) Oral every 6 hours PRN  aspirin enteric coated 81 milliGRAM(s) Oral daily  atorvastatin 80 milliGRAM(s) Oral at bedtime  dextrose 5%. 1000 milliLiter(s) IV Continuous <Continuous>  dextrose 5%. 1000 milliLiter(s) IV Continuous <Continuous>  dextrose 50% Injectable 25 Gram(s) IV Push once  dextrose 50% Injectable 12.5 Gram(s) IV Push once  dextrose 50% Injectable 25 Gram(s) IV Push once  dextrose Oral Gel 15 Gram(s) Oral once PRN  enoxaparin Injectable 40 milliGRAM(s) SubCutaneous every 24 hours  famotidine    Tablet 20 milliGRAM(s) Oral two times a day  glucagon  Injectable 1 milliGRAM(s) IntraMuscular once  insulin glargine Injectable (LANTUS) 12 Unit(s) SubCutaneous at bedtime  insulin lispro (ADMELOG) corrective regimen sliding scale   SubCutaneous three times a day before meals  insulin lispro Injectable (ADMELOG) 3 Unit(s) SubCutaneous three times a day before meals  losartan 50 milliGRAM(s) Oral daily  melatonin 5 milliGRAM(s) Oral at bedtime  metoprolol tartrate 12.5 milliGRAM(s) Oral two times a day      Vital Signs Last 24 Hrs  T(C): 36.6 (29 Jun 2025 08:00), Max: 37.1 (28 Jun 2025 20:00)  T(F): 97.8 (29 Jun 2025 08:00), Max: 98.7 (28 Jun 2025 20:00)  HR: 58 (29 Jun 2025 08:00) (58 - 68)  BP: 117/71 (29 Jun 2025 08:00) (105/61 - 138/90)  BP(mean): 92 (29 Jun 2025 08:00) (71 - 114)  RR: 18 (29 Jun 2025 08:00) (17 - 20)  SpO2: 98% (29 Jun 2025 08:00) (98% - 99%)    Parameters below as of 29 Jun 2025 08:00  Patient On (Oxygen Delivery Method): room air          Neurological Exam:   -Mental status: Awake, alert, oriented to person, place, month, and year. Speech is fluent with intact naming, repetition, and comprehension, no dysarthria. Recent and remote memory intact. Follows commands. Attention/concentration intact.   -Cranial nerves:   II: Visual fields are full to confrontation. BTT intact.   III, IV, VI: Extraocular movements are intact without nystagmus. Pupils equally round and reactive to light  V:  Facial sensation V1-V3 equal and intact   VII: Face is symmetric with normal eye closure and smile  XII: Tongue protrudes midline  Motor: Normal bulk and tone. No pronator drift. Strength bilateral upper extremity 5/5, bilateral lower extremities 5/5.  Sensation: decreased LUE/LLE sensation to pin prick and light touch, temperature intact.   Coordination: No dysmetria on finger-to-nose and heel-to-shin bilaterally  Reflexes: hyporeflexic in b/l LEs   Gait: Deferred at this encounter.  NIHSS: 1 (LUE & LLE sensory)    Labs:  CBC Full  -  ( 29 Jun 2025 05:35 )  WBC Count : 6.80 K/uL  RBC Count : 3.86 M/uL  Hemoglobin : 11.5 g/dL  Hematocrit : 35.3 %  Platelet Count - Automated : 246 K/uL  Mean Cell Volume : 91.5 fL  Mean Cell Hemoglobin : 29.8 pg  Mean Cell Hemoglobin Concentration : 32.6 g/dL  Auto Neutrophil # : x  Auto Lymphocyte # : x  Auto Monocyte # : x  Auto Eosinophil # : x  Auto Basophil # : x  Auto Neutrophil % : x  Auto Lymphocyte % : x  Auto Monocyte % : x  Auto Eosinophil % : x  Auto Basophil % : x    06-29    133[L]  |  100  |  26[H]  ----------------------------<  330[H]  4.9   |  20  |  1.1    Ca    8.9      29 Jun 2025 05:35  Phos  4.0     06-29  Mg     2.1     06-29    TPro  6.5  /  Alb  3.8  /  TBili  0.2  /  DBili  x   /  AST  13  /  ALT  10  /  AlkPhos  91  06-29    LIVER FUNCTIONS - ( 29 Jun 2025 05:35 )  Alb: 3.8 g/dL / Pro: 6.5 g/dL / ALK PHOS: 91 U/L / ALT: 10 U/L / AST: 13 U/L / GGT: x             Urinalysis Basic - ( 29 Jun 2025 05:35 )    Color: x / Appearance: x / SG: x / pH: x  Gluc: 330 mg/dL / Ketone: x  / Bili: x / Urobili: x   Blood: x / Protein: x / Nitrite: x   Leuk Esterase: x / RBC: x / WBC x   Sq Epi: x / Non Sq Epi: x / Bacteria: x        Assessment:  This is a 70y Female w/ h/o     Plan:

## 2025-06-29 NOTE — PROGRESS NOTE ADULT - SUBJECTIVE AND OBJECTIVE BOX
SUBJECTIVE:    Patient is a 70y old Female who presents with a chief complaint of Suspected stroke (28 Jun 2025 20:10)      HPI:  71 y/o F with a PMHx of HTN, HLD, CAD (supposed ot be on ASA 81mg once daily), DM, GERD, chronic back issues with back surgery in [place], [place] in [place] 5 years ago, presenting to the ED with subjective decreased sensation in the LUE/LLE. Pt states when she woke up 2 days ago, her LLE felt different to her and numb, and when she woke up yesterday, her LUE felt numb in addition to her LLE. She came to the ED with concerns for another MI as she states she felt L sided numbness in her arm and leg at that time too. Pt has not taken any of her medication in the past month because she is visiting [place] and cannot fill her prescription. /90. Denies accompanying speech issues, vision changes, facial involvement, extremity weakness, dizziness. Endorses that her gait has been abnormal for the past couple of years due to her back surgery, however does not feel her gait worsened. Denies known hx of stroke, TIA, seizure, neck pain.  (26 Jun 2025 19:36)      Currently admitted to medicine with the primary diagnosis of Left leg numbness     no medical complaints    Besides the pertinent positives and negatives described above, the ROS was within normal limits.    PAST MEDICAL & SURGICAL HISTORY  HTN (hypertension)    HLD (hyperlipidemia)    Myocardial infarct    Chronic GERD    Status post lumbar surgery      SOCIAL HISTORY:    ALLERGIES:  No Known Allergies    MEDICATIONS:  STANDING MEDICATIONS  aspirin enteric coated 81 milliGRAM(s) Oral daily  atorvastatin 80 milliGRAM(s) Oral at bedtime  clopidogrel Tablet 75 milliGRAM(s) Oral daily  dextrose 5%. 1000 milliLiter(s) IV Continuous <Continuous>  dextrose 5%. 1000 milliLiter(s) IV Continuous <Continuous>  dextrose 50% Injectable 25 Gram(s) IV Push once  dextrose 50% Injectable 12.5 Gram(s) IV Push once  dextrose 50% Injectable 25 Gram(s) IV Push once  enoxaparin Injectable 40 milliGRAM(s) SubCutaneous every 24 hours  famotidine    Tablet 20 milliGRAM(s) Oral two times a day  glucagon  Injectable 1 milliGRAM(s) IntraMuscular once  insulin glargine Injectable (LANTUS) 6 Unit(s) SubCutaneous at bedtime  insulin lispro (ADMELOG) corrective regimen sliding scale   SubCutaneous three times a day before meals  insulin lispro Injectable (ADMELOG) 2 Unit(s) SubCutaneous three times a day before meals  losartan 50 milliGRAM(s) Oral daily  melatonin 5 milliGRAM(s) Oral at bedtime  metoprolol tartrate 12.5 milliGRAM(s) Oral two times a day    PRN MEDICATIONS  acetaminophen     Tablet .. 650 milliGRAM(s) Oral every 6 hours PRN  dextrose Oral Gel 15 Gram(s) Oral once PRN    VITALS:   T(F): 97.8  HR: 58  BP: 117/71  RR: 18  SpO2: 98%    LABS:                        11.5   6.80  )-----------( 246      ( 29 Jun 2025 05:35 )             35.3     06-29    133[L]  |  100  |  26[H]  ----------------------------<  330[H]  4.9   |  20  |  1.1    Ca    8.9      29 Jun 2025 05:35  Phos  4.0     06-29  Mg     2.1     06-29    TPro  6.5  /  Alb  3.8  /  TBili  0.2  /  DBili  x   /  AST  13  /  ALT  10  /  AlkPhos  91  06-29    PT/INR - ( 27 Jun 2025 11:56 )   PT: 9.50 sec;   INR: 0.81 ratio         PTT - ( 27 Jun 2025 11:56 )  PTT:33.2 sec  Urinalysis Basic - ( 29 Jun 2025 05:35 )    Color: x / Appearance: x / SG: x / pH: x  Gluc: 330 mg/dL / Ketone: x  / Bili: x / Urobili: x   Blood: x / Protein: x / Nitrite: x   Leuk Esterase: x / RBC: x / WBC x   Sq Epi: x / Non Sq Epi: x / Bacteria: x            Urinalysis with Rflx Culture (collected 26 Jun 2025 14:04)          Left leg numbness      RADIOLOGY:    PHYSICAL EXAM:  General: WN/WD NAD  Neurology: A&Ox3, nonfocal, SAUNDERS x 4  Head:  Normocephalic, atraumatic  ENT:  Mucosa moist, no ulcerations  Neck:  Supple, no sinuses or palpable masses  Lymphatic:  No palpable cervical, supraclavicular, axillary or inguinal adenopathy  Respiratory: CTA B/L  CV: RRR, S1S2, no murmur  Abdominal: Soft, NT, ND no palpable mass  MSK: No edema, + peripheral pulses  Incisions: intact, no erythema or drainage    Intravenous access: yes  NG tube: no  Parkinson Catheter: no

## 2025-06-29 NOTE — DIETITIAN INITIAL EVALUATION ADULT - OTHER CALCULATIONS
Estimated Calorie Needs: [id number] x AF 1.2-1.3=1208-1309kcal/day   Estimated Protein Needs: 65-75grams/day (1.3-1.5grams/kg of admit weight) -Due to age and questionable CVA   Estimated Fluid Needs: 1208-1309mL/day (1mL/kcal)

## 2025-06-29 NOTE — DIETITIAN INITIAL EVALUATION ADULT - PHYSCIAL ASSESSMENT
well nourished/other (specify) Based on the results of the nutrition focused physical examination, the patient appears well nourished.

## 2025-06-29 NOTE — DIETITIAN INITIAL EVALUATION ADULT - LITERATURE/VIDEOS GIVEN
A diabetic, heart healthy nutrition education is provided via explanation and handout derived from the nutrition care manual.

## 2025-06-30 ENCOUNTER — TRANSCRIPTION ENCOUNTER (OUTPATIENT)
Age: 70
End: 2025-06-30

## 2025-06-30 VITALS
OXYGEN SATURATION: 98 % | RESPIRATION RATE: 18 BRPM | TEMPERATURE: 98 F | HEART RATE: 80 BPM | DIASTOLIC BLOOD PRESSURE: 61 MMHG | SYSTOLIC BLOOD PRESSURE: 116 MMHG

## 2025-06-30 PROBLEM — I10 ESSENTIAL (PRIMARY) HYPERTENSION: Chronic | Status: ACTIVE | Noted: 2025-06-26

## 2025-06-30 PROBLEM — I21.9 ACUTE MYOCARDIAL INFARCTION, UNSPECIFIED: Chronic | Status: ACTIVE | Noted: 2025-06-26

## 2025-06-30 PROBLEM — E78.5 HYPERLIPIDEMIA, UNSPECIFIED: Chronic | Status: ACTIVE | Noted: 2025-06-26

## 2025-06-30 PROBLEM — K21.9 GASTRO-ESOPHAGEAL REFLUX DISEASE WITHOUT ESOPHAGITIS: Chronic | Status: ACTIVE | Noted: 2025-06-26

## 2025-06-30 LAB
ALBUMIN SERPL ELPH-MCNC: 3.9 G/DL — SIGNIFICANT CHANGE UP (ref 3.5–5.2)
ALP SERPL-CCNC: 82 U/L — SIGNIFICANT CHANGE UP (ref 30–115)
ALT FLD-CCNC: 11 U/L — SIGNIFICANT CHANGE UP (ref 0–41)
ANION GAP SERPL CALC-SCNC: 12 MMOL/L — SIGNIFICANT CHANGE UP (ref 7–14)
AST SERPL-CCNC: 12 U/L — SIGNIFICANT CHANGE UP (ref 0–41)
BASOPHILS # BLD AUTO: 0.02 K/UL — SIGNIFICANT CHANGE UP (ref 0–0.2)
BASOPHILS NFR BLD AUTO: 0.3 % — SIGNIFICANT CHANGE UP (ref 0–1)
BILIRUB SERPL-MCNC: 0.2 MG/DL — SIGNIFICANT CHANGE UP (ref 0.2–1.2)
BUN SERPL-MCNC: 35 MG/DL — HIGH (ref 10–20)
CALCIUM SERPL-MCNC: 9.1 MG/DL — SIGNIFICANT CHANGE UP (ref 8.4–10.5)
CHLORIDE SERPL-SCNC: 103 MMOL/L — SIGNIFICANT CHANGE UP (ref 98–110)
CO2 SERPL-SCNC: 21 MMOL/L — SIGNIFICANT CHANGE UP (ref 17–32)
CREAT SERPL-MCNC: 1 MG/DL — SIGNIFICANT CHANGE UP (ref 0.7–1.5)
EGFR: 60 ML/MIN/1.73M2 — SIGNIFICANT CHANGE UP
EGFR: 60 ML/MIN/1.73M2 — SIGNIFICANT CHANGE UP
EOSINOPHIL # BLD AUTO: 0.01 K/UL — SIGNIFICANT CHANGE UP (ref 0–0.7)
EOSINOPHIL NFR BLD AUTO: 0.2 % — SIGNIFICANT CHANGE UP (ref 0–8)
GLUCOSE BLDC GLUCOMTR-MCNC: 266 MG/DL — HIGH (ref 70–99)
GLUCOSE BLDC GLUCOMTR-MCNC: 315 MG/DL — HIGH (ref 70–99)
GLUCOSE SERPL-MCNC: 273 MG/DL — HIGH (ref 70–99)
HCT VFR BLD CALC: 33.8 % — LOW (ref 37–47)
HGB BLD-MCNC: 11.2 G/DL — LOW (ref 12–16)
IMM GRANULOCYTES NFR BLD AUTO: 0.9 % — HIGH (ref 0.1–0.3)
LYMPHOCYTES # BLD AUTO: 1.79 K/UL — SIGNIFICANT CHANGE UP (ref 1.2–3.4)
LYMPHOCYTES # BLD AUTO: 31.2 % — SIGNIFICANT CHANGE UP (ref 20.5–51.1)
MAGNESIUM SERPL-MCNC: 2 MG/DL — SIGNIFICANT CHANGE UP (ref 1.8–2.4)
MCHC RBC-ENTMCNC: 30.4 PG — SIGNIFICANT CHANGE UP (ref 27–31)
MCHC RBC-ENTMCNC: 33.1 G/DL — SIGNIFICANT CHANGE UP (ref 32–37)
MCV RBC AUTO: 91.6 FL — SIGNIFICANT CHANGE UP (ref 81–99)
MONOCYTES # BLD AUTO: 0.77 K/UL — HIGH (ref 0.1–0.6)
MONOCYTES NFR BLD AUTO: 13.4 % — HIGH (ref 1.7–9.3)
NEUTROPHILS # BLD AUTO: 3.09 K/UL — SIGNIFICANT CHANGE UP (ref 1.4–6.5)
NEUTROPHILS NFR BLD AUTO: 54 % — SIGNIFICANT CHANGE UP (ref 42.2–75.2)
NRBC BLD AUTO-RTO: 0 /100 WBCS — SIGNIFICANT CHANGE UP (ref 0–0)
PLATELET # BLD AUTO: 223 K/UL — SIGNIFICANT CHANGE UP (ref 130–400)
PMV BLD: 11.1 FL — HIGH (ref 7.4–10.4)
POTASSIUM SERPL-MCNC: 4.8 MMOL/L — SIGNIFICANT CHANGE UP (ref 3.5–5)
POTASSIUM SERPL-SCNC: 4.8 MMOL/L — SIGNIFICANT CHANGE UP (ref 3.5–5)
PROT SERPL-MCNC: 6.3 G/DL — SIGNIFICANT CHANGE UP (ref 6–8)
RBC # BLD: 3.69 M/UL — LOW (ref 4.2–5.4)
RBC # FLD: 13.5 % — SIGNIFICANT CHANGE UP (ref 11.5–14.5)
SODIUM SERPL-SCNC: 136 MMOL/L — SIGNIFICANT CHANGE UP (ref 135–146)
WBC # BLD: 5.73 K/UL — SIGNIFICANT CHANGE UP (ref 4.8–10.8)
WBC # FLD AUTO: 5.73 K/UL — SIGNIFICANT CHANGE UP (ref 4.8–10.8)

## 2025-06-30 PROCEDURE — 99239 HOSP IP/OBS DSCHRG MGMT >30: CPT

## 2025-06-30 RX ORDER — INSULIN LISPRO 100 U/ML
5 INJECTION, SOLUTION INTRAVENOUS; SUBCUTANEOUS
Qty: 3 | Refills: 0
Start: 2025-06-30 | End: 2025-07-13

## 2025-06-30 RX ORDER — INSULIN LISPRO 100 U/ML
5 INJECTION, SOLUTION INTRAVENOUS; SUBCUTANEOUS
Refills: 0 | Status: DISCONTINUED | OUTPATIENT
Start: 2025-06-30 | End: 2025-06-30

## 2025-06-30 RX ORDER — LANOLIN/MINERAL OIL/PETROLATUM
1 OINTMENT (GRAM) OPHTHALMIC (EYE) THREE TIMES A DAY
Refills: 0 | Status: DISCONTINUED | OUTPATIENT
Start: 2025-06-30 | End: 2025-06-30

## 2025-06-30 RX ORDER — INSULIN GLARGINE-YFGN 100 [IU]/ML
15 INJECTION, SOLUTION SUBCUTANEOUS AT BEDTIME
Refills: 0 | Status: DISCONTINUED | OUTPATIENT
Start: 2025-06-30 | End: 2025-06-30

## 2025-06-30 RX ORDER — INSULIN GLARGINE-YFGN 100 [IU]/ML
0 INJECTION, SOLUTION SUBCUTANEOUS
Refills: 0 | DISCHARGE

## 2025-06-30 RX ORDER — CLOPIDOGREL BISULFATE 75 MG/1
1 TABLET, FILM COATED ORAL
Refills: 0 | DISCHARGE

## 2025-06-30 RX ORDER — INSULIN ASPART 100 [IU]/ML
0 INJECTION, SOLUTION INTRAVENOUS; SUBCUTANEOUS
Refills: 0 | DISCHARGE

## 2025-06-30 RX ORDER — ATORVASTATIN CALCIUM 80 MG/1
1 TABLET, FILM COATED ORAL
Qty: 30 | Refills: 0
Start: 2025-06-30 | End: 2025-07-29

## 2025-06-30 RX ORDER — INSULIN GLARGINE-YFGN 100 [IU]/ML
15 INJECTION, SOLUTION SUBCUTANEOUS
Qty: 3 | Refills: 0
Start: 2025-06-30 | End: 2025-07-13

## 2025-06-30 RX ORDER — ASPIRIN 325 MG
1 TABLET ORAL
Qty: 30 | Refills: 0
Start: 2025-06-30 | End: 2025-07-29

## 2025-06-30 RX ADMIN — Medication 1 APPLICATION(S): at 11:59

## 2025-06-30 RX ADMIN — LOSARTAN POTASSIUM 50 MILLIGRAM(S): 100 TABLET, FILM COATED ORAL at 06:06

## 2025-06-30 RX ADMIN — Medication 20 MILLIGRAM(S): at 06:06

## 2025-06-30 RX ADMIN — Medication 81 MILLIGRAM(S): at 11:57

## 2025-06-30 RX ADMIN — ENOXAPARIN SODIUM 40 MILLIGRAM(S): 100 INJECTION SUBCUTANEOUS at 08:07

## 2025-06-30 RX ADMIN — Medication 650 MILLIGRAM(S): at 10:01

## 2025-06-30 RX ADMIN — INSULIN LISPRO 3 UNIT(S): 100 INJECTION, SOLUTION INTRAVENOUS; SUBCUTANEOUS at 11:56

## 2025-06-30 RX ADMIN — METOPROLOL SUCCINATE 12.5 MILLIGRAM(S): 50 TABLET, EXTENDED RELEASE ORAL at 06:06

## 2025-06-30 RX ADMIN — INSULIN LISPRO 6: 100 INJECTION, SOLUTION INTRAVENOUS; SUBCUTANEOUS at 11:56

## 2025-06-30 RX ADMIN — INSULIN LISPRO 3 UNIT(S): 100 INJECTION, SOLUTION INTRAVENOUS; SUBCUTANEOUS at 08:07

## 2025-06-30 RX ADMIN — INSULIN LISPRO 8: 100 INJECTION, SOLUTION INTRAVENOUS; SUBCUTANEOUS at 08:08

## 2025-06-30 NOTE — PROGRESS NOTE ADULT - SUBJECTIVE AND OBJECTIVE BOX
--------INCOMPLETE Stroke Progress Note--------    INTERVAL HPI / OVERNIGHT EVENTS:  Overnight, no acute events.  Today, patient was seen and examined.  number ______ used.    Vital Signs Last 24 Hrs  T(C): 36.6 (30 Jun 2025 06:00), Max: 37.4 (29 Jun 2025 14:00)  T(F): 97.9 (30 Jun 2025 06:00), Max: 99.4 (29 Jun 2025 14:00)  HR: 64 (30 Jun 2025 06:00) (58 - 78)  BP: 142/71 (30 Jun 2025 06:00) (106/59 - 142/71)  BP(mean): 93 (30 Jun 2025 06:00) (72 - 95)  RR: 18 (30 Jun 2025 06:00) (18 - 18)  SpO2: 98% (30 Jun 2025 06:00) (97% - 98%)    Parameters below as of 30 Jun 2025 06:00  Patient On (Oxygen Delivery Method): room air        Physical exam:  General: No acute distress, awake and alert  Eyes: Anicteric sclerae, moist conjunctivae, see below for CNs  Neck: trachea midline, FROM, supple, no thyromegaly or lymphadenopathy  Cardiovascular: Regular rate and rhythm, no murmurs, rubs, or gallops. No carotid bruits.   Pulmonary: Anterior breath sounds clear bilaterally, no crackles or wheezing. No use of accessory muscles  GI: Abdomen soft, non-distended, non-tender  Extremities: Radial and DP pulses +2, no edema    Neurologic:  -Mental status: Awake, alert, oriented to person, place, and time. Speech is fluent with intact fluency, naming, repetition, comprehension, and reading, no dysarthria. Recent and remote memory intact. Follows commands. Attention/concentration intact. Fund of knowledge appropriate.  -Cranial nerves:   II: PERRLA; VFF intact  III, IV, VI: Extraocular movements are intact without nystagmus.   V:  Facial sensation V1-V3 equal and intact   VII: Face is symmetric with normal eye closure and smile  VIII: Hearing is bilaterally intact to finger rub  IX, X: Uvula is midline and soft palate rises symmetrically  XI: Head turning and shoulder shrug are intact.  XII: Tongue protrudes midline  -Motor: Normal bulk and tone. No pronator drift. Strength bilateral upper extremity 5/5, bilateral lower extremities 5/5.  -Reflexes: 2+ in b/l UE and LE. Downgoing toes bilaterally   -Sensation: Intact to light touch bilaterally. No neglect or extinction on double simultaneous testing.  -Coordination: No dysmetria on finger-to-nose and heel-to-shin bilaterally. Rapid alternating movements intact and symmetric  -Gait: deferred    NIHSS:    LABS:                        11.2   5.73  )-----------( 223      ( 30 Jun 2025 04:55 )             33.8     06-30    136  |  103  |  35[H]  ----------------------------<  273[H]  4.8   |  21  |  1.0    Ca    9.1      30 Jun 2025 04:55  Phos  4.0     06-29  Mg     2.0     06-30    TPro  6.3  /  Alb  3.9  /  TBili  0.2  /  DBili  x   /  AST  12  /  ALT  11  /  AlkPhos  82  06-30      Urinalysis Basic - ( 30 Jun 2025 04:55 )    Color: x / Appearance: x / SG: x / pH: x  Gluc: 273 mg/dL / Ketone: x  / Bili: x / Urobili: x   Blood: x / Protein: x / Nitrite: x   Leuk Esterase: x / RBC: x / WBC x   Sq Epi: x / Non Sq Epi: x / Bacteria: x        MEDICATIONS  (STANDING):  aspirin enteric coated 81 milliGRAM(s) Oral daily  atorvastatin 80 milliGRAM(s) Oral at bedtime  dextrose 5%. 1000 milliLiter(s) (50 mL/Hr) IV Continuous <Continuous>  dextrose 5%. 1000 milliLiter(s) (100 mL/Hr) IV Continuous <Continuous>  dextrose 50% Injectable 25 Gram(s) IV Push once  dextrose 50% Injectable 12.5 Gram(s) IV Push once  dextrose 50% Injectable 25 Gram(s) IV Push once  enoxaparin Injectable 40 milliGRAM(s) SubCutaneous every 24 hours  famotidine    Tablet 20 milliGRAM(s) Oral two times a day  glucagon  Injectable 1 milliGRAM(s) IntraMuscular once  insulin glargine Injectable (LANTUS) 12 Unit(s) SubCutaneous at bedtime  insulin lispro (ADMELOG) corrective regimen sliding scale   SubCutaneous three times a day before meals  insulin lispro Injectable (ADMELOG) 3 Unit(s) SubCutaneous three times a day before meals  losartan 50 milliGRAM(s) Oral daily  melatonin 5 milliGRAM(s) Oral at bedtime  metoprolol tartrate 12.5 milliGRAM(s) Oral two times a day    MEDICATIONS  (PRN):  acetaminophen     Tablet .. 650 milliGRAM(s) Oral every 6 hours PRN Mild Pain (1 - 3), Moderate Pain (4 - 6)  dextrose Oral Gel 15 Gram(s) Oral once PRN Blood Glucose LESS THAN 70 milliGRAM(s)/deciliter    Allergies    No Known Allergies    Intolerances        RADIOLOGY & ADDITIONAL TESTS:

## 2025-06-30 NOTE — PROGRESS NOTE ADULT - REASON FOR ADMISSION
Suspected stroke

## 2025-06-30 NOTE — PROGRESS NOTE ADULT - ATTENDING COMMENTS
70F, Danish-speaking, PMHx of HTN, HLD, CAD (supposed to be on ASA 81mg once daily), DM, GERD, chronic back issues with back surgery in [place], [place] in [place] 5 years ago, p/w left-sided numbness and tingling for 2 days. CTH negative for acute stroke, CTA with 7mm fusiform aneurysm of the Right V4. Admitted to stroke unit for further workup.    stroke workup negative  MR c-spine noted for cervical stenosis- outpatient PT  DM- increase ademalog premeal and basal lantus for FSBS control  unruptured cerebral aneurysm- f/u with neuroIR; sbp control.
Left sided paresthesias, resolved after a few days. MRI brain without acute ischemia, TIA less likely given duration. MRI cervical spine with some foraminal stenosis and spinal stenosis. Continue ASA. Pt lives in [place], discussed importance of close f/u with PCP for diabetes management.    Anisa , Harman, ID # [id number]
70F, Spanish-speaking, PMHx of HTN, HLD, CAD (supposed to be on ASA 81mg once daily), DM, GERD, chronic back issues with back surgery in [place], STEMI in [place] 5 years ago, p/w left-sided numbness and tingling for 2 days. CTH negative for acute stroke, CTA with 7mm fusiform aneurysm of the Right V4. Admitted to stroke unit for further workup.    stroke workup negative  MR c-spine noted for cervical stenosis- outpatient PT  DM- add lantus for FSBS control  unruptured cerebral aneurysm- f/u with neuroIR; sbp control

## 2025-06-30 NOTE — DISCHARGE NOTE PROVIDER - NSDCMRMEDTOKEN_GEN_ALL_CORE_FT
Basaglar KwraoulPen 100 units/mL subcutaneous solution: subcutaneous  Cozaar 50 mg oral tablet: 1 tab(s) orally once a day  famotidine 20 mg oral tablet: 1 tab(s) orally 2 times a day  ferrous sulfate: 325 orally Monday, Wednesday, and Friday  Melatonin 5 mg oral tablet: 1 tab(s) orally once a day (at bedtime)  metFORMIN 500 mg oral tablet: 1 tab(s) orally once a day  metoprolol succinate 25 mg oral tablet, extended release: 1 tab(s) orally once a day  NovoLOG 100 units/mL subcutaneous solution: subcutaneous  omeprazole 20 mg oral delayed release tablet: 1 tab(s) orally once a day before breakfast  Plavix 75 mg oral tablet: 1 tab(s) orally once a day   aspirin 81 mg oral delayed release tablet: 1 tab(s) orally once a day  atorvastatin 80 mg oral tablet: 1 tab(s) orally once a day (at bedtime)  Cozaar 50 mg oral tablet: 1 tab(s) orally once a day  famotidine 20 mg oral tablet: 1 tab(s) orally 2 times a day  ferrous sulfate: 325 orally Monday, Wednesday, and Friday  Freestyle Francisco Javier 2 [place]: Dispense 1 [place]  HumaLOG 100 units/mL injectable solution: 5 unit(s) injectable 3 times a day (before meals)  Lantus 100 units/mL subcutaneous solution: 15 unit(s) subcutaneous once a day (at bedtime)  Melatonin 5 mg oral tablet: 1 tab(s) orally once a day (at bedtime)  metFORMIN 500 mg oral tablet: 1 tab(s) orally once a day  metoprolol succinate 25 mg oral tablet, extended release: 1 tab(s) orally once a day  omeprazole 20 mg oral delayed release tablet: 1 tab(s) orally once a day before breakfast

## 2025-06-30 NOTE — PROGRESS NOTE ADULT - TIME-BASED
Pt called back in stating she has appointment for tomorrow, wondering if she has a copay. Advised there is a $45 copay. Pt would like to reschedule as she cannot make that payment tomorrow. Confirmed with Mario at [facility] clerical, the copay can be billed to pt if she is unable to pay tomorrow. Pt made aware and will keep appointment as scheduled.    50

## 2025-06-30 NOTE — DISCHARGE NOTE PROVIDER - NSDCCPCAREPLAN_GEN_ALL_CORE_FT
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 A DSA (angiogram) was completed during your hospitalization and no intervention is recommended at this point. Please continue to take your blood thinner, aspirin. Please follow up with a neuroendovascular specialist in [place] to discuss further monitoring and treatment. You can request for the imaging and reports from [facility] Record so that you will be able to bring to the providers in [place].      SECONDARY DISCHARGE DIAGNOSES  Diagnosis: Diabetes  Assessment and Plan of Treatment: Your A1c is found to be 10, which indicates that over the past 3 months, your blood sugar has been poorly controled. We have increased your insulin to: Lispro 5 units 3 times a day before meals and Lantus 15 units once a day before bed. Please make sure to see your primary care doctor or endocrinologist within 1 week after you return to [place] to better control your blood sugar.

## 2025-06-30 NOTE — DISCHARGE NOTE PROVIDER - HOSPITAL COURSE
Hospital course:  70F, Hebrew-speaking, PMHx of HTN, HLD, CAD (supposed to be on ASA 81mg once daily), DM, GERD, chronic back issues with back surgery in [place], STEMI in [place] 5 years ago, p/w left-sided numbness and tingling for 2 days. CTH negative for acute stroke, CTA with 7mm fusiform aneurysm of the Right V4. Admitted to stroke unit for further workup. MRI brain without evidence of stroke. MR C-spine without cord abnormality. S/p DSA without intervention. Suspect TIA vs radiculopathy.    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  [x] MR C-spine: No cervical cord compression or cord signal abnormality. Multilevel degenerative changes, worse at C5-6 with moderate spinal stenosis and severe bilateral neuroforaminal stenosis.  [x] TTE: 60-65%, normal atrial size  [x] DSA: A fusiform dilatation in the distal V4 segment of the right VA, concerning for possible fusiform aneurysm, measuring approximately 6.6 mm in size, is visualized.    Physical exam at discharge:  -Mental status: Awake, alert, oriented to person, place, month, and year. Speech is fluent with intact naming, repetition, and comprehension, no dysarthria. Recent and remote memory intact. Follows commands. Attention/concentration intact.   -Cranial nerves:   II: Visual fields are full to confrontation. BTT intact.   III, IV, VI: Extraocular movements are intact without nystagmus. Pupils equally round and reactive to light  V:  Facial sensation V1-V3 equal and intact   VII: Face is symmetric with normal eye closure and smile  XII: Tongue protrudes midline  Motor: Normal bulk and tone. No pronator drift. Strength bilateral upper extremity 5/5, bilateral lower extremities 5/5.  Sensation: Intact to pin prick, and light touch, temperature intact.   Coordination: No dysmetria on finger-to-nose and heel-to-shin bilaterally  Reflexes: hyporeflexic in b/l LEs   Gait: Deferred at this encounter    NIHSS: 0    New medications on discharge: none  Labs to be followed up: none  Imaging to be done as outpatient: none  Further outpatient workup:  - Dr. Hoang for aneurysm   Hospital course:  70F, Japanese-speaking, PMHx of HTN, HLD, CAD (supposed to be on ASA 81mg once daily), DM, GERD, chronic back issues with back surgery in [place], STEMI in [place] 5 years ago, p/w left-sided numbness and tingling for 2 days. CTH negative for acute stroke, CTA with 7mm fusiform aneurysm of the Right V4. Admitted to stroke unit for further workup. MRI brain without evidence of stroke. MR C-spine without cord abnormality. S/p DSA without intervention. Suspect TIA vs radiculopathy.    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  [x] MR C-spine: No cervical cord compression or cord signal abnormality. Multilevel degenerative changes, worse at C5-6 with moderate spinal stenosis and severe bilateral neuroforaminal stenosis.  [x] TTE: 60-65%, normal atrial size  [x] DSA: A fusiform dilatation in the distal V4 segment of the right VA, concerning for possible fusiform aneurysm, measuring approximately 6.6 mm in size, is visualized.    Physical exam at discharge:  -Mental status: Awake, alert, oriented to person, place, month, and year. Speech is fluent with intact naming, repetition, and comprehension, no dysarthria. Recent and remote memory intact. Follows commands. Attention/concentration intact.   -Cranial nerves:   II: Visual fields are full to confrontation. BTT intact.   III, IV, VI: Extraocular movements are intact without nystagmus. Pupils equally round and reactive to light  V:  Facial sensation V1-V3 equal and intact   VII: Face is symmetric with normal eye closure and smile  XII: Tongue protrudes midline  Motor: Normal bulk and tone. No pronator drift. Strength bilateral upper extremity 5/5, bilateral lower extremities 5/5.  Sensation: Intact to pin prick, and light touch, temperature intact.   Coordination: No dysmetria on finger-to-nose and heel-to-shin bilaterally  Reflexes: hyporeflexic in b/l LEs   Gait: Deferred at this encounter    NIHSS: 0    New medications on discharge: none  Labs to be followed up: none  Imaging to be done as outpatient: none  Further outpatient workup:  - Dr. Hoang for aneurysm    Stroke attending attestation:  Left sided paresthesias, resolved after a few days. MRI brain without acute ischemia, TIA less likely given duration. MRI cervical spine with some foraminal stenosis and spinal stenosis. CTA head/neck with incidental right V4 fusiform aneurysm, s/p DSA with Dr. Hoang, no intervention. Continue ASA. Pt lives in [place], discussed importance of close f/u with PCP for diabetes management.   Hospital course:  70F, Albanian-speaking, PMHx of HTN, HLD, CAD (supposed to be on ASA 81mg once daily), DM, GERD, chronic back issues with back surgery in [place], STEMI in [place] 5 years ago, p/w left-sided numbness and tingling for 2 days. CTH negative for acute stroke, CTA with 7mm fusiform aneurysm of the Right V4. Admitted to stroke unit for further workup. MRI brain without evidence of stroke. MR C-spine without cord abnormality. S/p DSA without intervention. Suspect radiculopathy vs less likely TIA.    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  [x] MR C-spine: No cervical cord compression or cord signal abnormality. Multilevel degenerative changes, worse at C5-6 with moderate spinal stenosis and severe bilateral neuroforaminal stenosis.  [x] TTE: 60-65%, normal atrial size  [x] DSA: A fusiform dilatation in the distal V4 segment of the right VA, concerning for possible fusiform aneurysm, measuring approximately 6.6 mm in size, is visualized.    Physical exam at discharge:  -Mental status: Awake, alert, oriented to person, place, month, and year. Speech is fluent with intact naming, repetition, and comprehension, no dysarthria. Recent and remote memory intact. Follows commands. Attention/concentration intact.   -Cranial nerves:   II: Visual fields are full to confrontation. BTT intact.   III, IV, VI: Extraocular movements are intact without nystagmus. Pupils equally round and reactive to light  V:  Facial sensation V1-V3 equal and intact   VII: Face is symmetric with normal eye closure and smile  XII: Tongue protrudes midline  Motor: Normal bulk and tone. No pronator drift. Strength bilateral upper extremity 5/5, bilateral lower extremities 5/5.  Sensation: Intact to pin prick, and light touch, temperature intact.   Coordination: No dysmetria on finger-to-nose and heel-to-shin bilaterally  Reflexes: hyporeflexic in b/l LEs   Gait: Deferred at this encounter    NIHSS: 0    New medications on discharge: none  Labs to be followed up: none  Imaging to be done as outpatient: none  Further outpatient workup:  - PCP f/u in [place] next week for better serum glucose control  - f/u Neurology & Neuroendovascular f/u in [place] for aneurysm      Stroke attending attestation:  Left sided paresthesias, resolved after a few days. MRI brain without acute ischemia, TIA less likely given duration. MRI cervical spine with some foraminal stenosis and spinal stenosis. CTA head/neck with incidental right V4 fusiform aneurysm, s/p DSA with Dr. Hoang, no intervention. Continue ASA. Pt lives in [place], discussed importance of close f/u with PCP for diabetes management.   Hospital course:  70F, Armenian-speaking, PMHx of HTN, HLD, CAD (supposed to be on ASA 81mg once daily), DM, GERD, chronic back issues with back surgery in [place], STEMI in [place] 5 years ago, p/w left-sided numbness and tingling for 2 days. CTH negative for acute stroke, CTA with 7mm fusiform aneurysm of the Right V4. Admitted to stroke unit for further workup. MRI brain without evidence of stroke. MR C-spine without cord abnormality. S/p DSA without intervention. Suspect radiculopathy vs less likely TIA.    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 Moderate chronic microvascular ischemic changes.  [x] MR C-spine: No cervical cord compression or cord signal abnormality. Multilevel degenerative changes, worse at C5-6 with moderate spinal stenosis and severe bilateral neuroforaminal stenosis.  [x] TTE: 60-65%, normal atrial size  [x] DSA: A fusiform dilatation in the distal V4 segment of the right VA, concerning for possible fusiform aneurysm, measuring approximately 6.6 mm in size, is visualized.    Physical exam at discharge:  -Mental status: Awake, alert, oriented to person, place, month, and year. Speech is fluent with intact naming, repetition, and comprehension, no dysarthria. Recent and remote memory intact. Follows commands. Attention/concentration intact.   -Cranial nerves:   II: Visual fields are full to confrontation. BTT intact.   III, IV, VI: Extraocular movements are intact without nystagmus. Pupils equally round and reactive to light  V:  Facial sensation V1-V3 equal and intact   VII: Face is symmetric with normal eye closure and smile  XII: Tongue protrudes midline  Motor: Normal bulk and tone. No pronator drift. Strength bilateral upper extremity 5/5, bilateral lower extremities 5/5.  Sensation: Intact to pin prick, and light touch, temperature intact.   Coordination: No dysmetria on finger-to-nose and heel-to-shin bilaterally  Reflexes: hyporeflexic in b/l LEs   Gait: Deferred at this encounter    NIHSS: 0    New medications on discharge: none  Labs to be followed up: none  Imaging to be done as outpatient: none  Further outpatient workup:  - PCP f/u in [place] next week for better serum glucose control  - f/u Neurology & Neuroendovascular f/u in [place] for aneurysm      Stroke attending attestation:  Left sided paresthesias, resolved after a few days. MRI brain without acute ischemia, TIA less likely given duration. MRI cervical spine with some foraminal stenosis and spinal stenosis. CTA head/neck with incidental right V4 fusiform aneurysm, s/p DSA with Dr. Hoang, no intervention. Continue ASA. Pt lives in [place], discussed importance of close f/u with PCP for diabetes management.

## 2025-07-01 RX ORDER — INSULIN ASPART 100 [IU]/ML
5 INJECTION, SOLUTION INTRAVENOUS; SUBCUTANEOUS
Qty: 6 | Refills: 1
Start: 2025-07-01 | End: 2025-09-18

## 2025-07-08 PROBLEM — Z00.00 ENCOUNTER FOR PREVENTIVE HEALTH EXAMINATION: Status: ACTIVE | Noted: 2025-07-08

## 2025-07-11 DIAGNOSIS — R20.2 PARESTHESIA OF SKIN: ICD-10-CM

## 2025-07-11 DIAGNOSIS — M48.02 SPINAL STENOSIS, CERVICAL REGION: ICD-10-CM

## 2025-07-11 DIAGNOSIS — Z79.84 LONG TERM (CURRENT) USE OF ORAL HYPOGLYCEMIC DRUGS: ICD-10-CM

## 2025-07-11 DIAGNOSIS — K21.9 GASTRO-ESOPHAGEAL REFLUX DISEASE WITHOUT ESOPHAGITIS: ICD-10-CM

## 2025-07-11 DIAGNOSIS — E11.9 TYPE 2 DIABETES MELLITUS WITHOUT COMPLICATIONS: ICD-10-CM

## 2025-07-11 DIAGNOSIS — E78.5 HYPERLIPIDEMIA, UNSPECIFIED: ICD-10-CM

## 2025-07-11 DIAGNOSIS — I10 ESSENTIAL (PRIMARY) HYPERTENSION: ICD-10-CM

## 2025-07-11 DIAGNOSIS — Z79.4 LONG TERM (CURRENT) USE OF INSULIN: ICD-10-CM

## 2025-07-11 DIAGNOSIS — R51.9 HEADACHE, UNSPECIFIED: ICD-10-CM

## 2025-07-11 DIAGNOSIS — I67.1 CEREBRAL ANEURYSM, NONRUPTURED: ICD-10-CM

## 2025-07-11 DIAGNOSIS — Z79.02 LONG TERM (CURRENT) USE OF ANTITHROMBOTICS/ANTIPLATELETS: ICD-10-CM

## 2025-07-11 DIAGNOSIS — G89.29 OTHER CHRONIC PAIN: ICD-10-CM

## 2025-07-11 DIAGNOSIS — I25.2 OLD MYOCARDIAL INFARCTION: ICD-10-CM

## 2025-07-11 DIAGNOSIS — I25.10 ATHEROSCLEROTIC HEART DISEASE OF NATIVE CORONARY ARTERY WITHOUT ANGINA PECTORIS: ICD-10-CM

## 2025-07-11 DIAGNOSIS — M54.9 DORSALGIA, UNSPECIFIED: ICD-10-CM

## 2025-07-11 DIAGNOSIS — R20.0 ANESTHESIA OF SKIN: ICD-10-CM

## 2025-07-11 DIAGNOSIS — R11.0 NAUSEA: ICD-10-CM

## 2025-07-11 DIAGNOSIS — Z79.899 OTHER LONG TERM (CURRENT) DRUG THERAPY: ICD-10-CM

## 2025-07-21 ENCOUNTER — NON-APPOINTMENT (OUTPATIENT)
Age: 70
End: 2025-07-21

## 2025-07-23 ENCOUNTER — APPOINTMENT (OUTPATIENT)
Dept: NEUROLOGY | Facility: CLINIC | Age: 70
End: 2025-07-23
Payer: MEDICARE

## 2025-07-23 ENCOUNTER — NON-APPOINTMENT (OUTPATIENT)
Age: 70
End: 2025-07-23

## 2025-07-23 VITALS
OXYGEN SATURATION: 98 % | HEART RATE: 75 BPM | DIASTOLIC BLOOD PRESSURE: 74 MMHG | TEMPERATURE: 97.8 F | BODY MASS INDEX: 18.66 KG/M2 | SYSTOLIC BLOOD PRESSURE: 142 MMHG | HEIGHT: 65 IN | WEIGHT: 112 LBS

## 2025-07-23 DIAGNOSIS — I72.6 ANEURYSM OF VERTEBRAL ARTERY: ICD-10-CM

## 2025-07-23 PROCEDURE — 99215 OFFICE O/P EST HI 40 MIN: CPT

## 2025-07-23 RX ORDER — METOPROLOL SUCCINATE 25 MG/1
25 TABLET, EXTENDED RELEASE ORAL DAILY
Refills: 0 | Status: ACTIVE | COMMUNITY

## 2025-07-23 RX ORDER — INSULIN ASPART 100 [IU]/ML
100 INJECTION, SOLUTION INTRAVENOUS; SUBCUTANEOUS
Refills: 0 | Status: ACTIVE | COMMUNITY

## 2025-07-23 RX ORDER — CYANOCOBALAMIN (VITAMIN B-12) 1000 MCG
1000 TABLET ORAL
Refills: 0 | Status: ACTIVE | COMMUNITY

## 2025-07-23 RX ORDER — INSULIN GLARGINE 100 [IU]/ML
100 INJECTION, SOLUTION SUBCUTANEOUS
Refills: 0 | Status: ACTIVE | COMMUNITY

## 2025-07-23 RX ORDER — FAMOTIDINE 20 MG/1
20 TABLET, FILM COATED ORAL TWICE DAILY
Refills: 0 | Status: ACTIVE | COMMUNITY

## 2025-07-23 RX ORDER — CHLORHEXIDINE GLUCONATE 4 %
5 LIQUID (ML) TOPICAL
Refills: 0 | Status: ACTIVE | COMMUNITY

## 2025-07-23 RX ORDER — ASPIRIN 81 MG/1
81 TABLET, COATED ORAL DAILY
Refills: 0 | Status: ACTIVE | COMMUNITY

## 2025-07-23 RX ORDER — FERROUS SULFATE 325(65) MG
325 TABLET ORAL
Refills: 0 | Status: ACTIVE | COMMUNITY

## 2025-07-23 RX ORDER — LOSARTAN POTASSIUM 50 MG/1
50 TABLET, FILM COATED ORAL DAILY
Refills: 0 | Status: ACTIVE | COMMUNITY

## 2025-07-23 RX ORDER — METFORMIN HYDROCHLORIDE 500 MG/1
500 TABLET, COATED ORAL DAILY
Refills: 0 | Status: ACTIVE | COMMUNITY

## 2025-07-23 RX ORDER — ATORVASTATIN CALCIUM 80 MG/1
80 TABLET, FILM COATED ORAL
Qty: 90 | Refills: 3 | Status: ACTIVE | COMMUNITY

## 2025-07-23 RX ORDER — CLOPIDOGREL BISULFATE 75 MG/1
75 TABLET, FILM COATED ORAL DAILY
Refills: 0 | Status: ACTIVE | COMMUNITY